# Patient Record
Sex: FEMALE | Race: BLACK OR AFRICAN AMERICAN | NOT HISPANIC OR LATINO | Employment: OTHER | URBAN - METROPOLITAN AREA
[De-identification: names, ages, dates, MRNs, and addresses within clinical notes are randomized per-mention and may not be internally consistent; named-entity substitution may affect disease eponyms.]

---

## 2024-01-01 ENCOUNTER — HOSPITAL ENCOUNTER (INPATIENT)
Facility: HOSPITAL | Age: 59
LOS: 3 days | DRG: 441 | End: 2024-11-09
Attending: HOSPITALIST | Admitting: HOSPITALIST
Payer: MEDICAID

## 2024-01-01 VITALS
OXYGEN SATURATION: 74 % | TEMPERATURE: 97 F | HEIGHT: 63 IN | SYSTOLIC BLOOD PRESSURE: 104 MMHG | DIASTOLIC BLOOD PRESSURE: 41 MMHG | HEART RATE: 22 BPM | BODY MASS INDEX: 29.95 KG/M2 | WEIGHT: 169.06 LBS

## 2024-01-01 DIAGNOSIS — N17.9 AKI (ACUTE KIDNEY INJURY): ICD-10-CM

## 2024-01-01 DIAGNOSIS — K74.60 DECOMPENSATED HEPATIC CIRRHOSIS: ICD-10-CM

## 2024-01-01 DIAGNOSIS — K76.7 HEPATORENAL SYNDROME: ICD-10-CM

## 2024-01-01 DIAGNOSIS — J96.01 ACUTE HYPOXEMIC RESPIRATORY FAILURE: Primary | ICD-10-CM

## 2024-01-01 DIAGNOSIS — R18.8 CIRRHOSIS OF LIVER WITH ASCITES, UNSPECIFIED HEPATIC CIRRHOSIS TYPE: ICD-10-CM

## 2024-01-01 DIAGNOSIS — R07.9 CHEST PAIN: ICD-10-CM

## 2024-01-01 DIAGNOSIS — J98.8 AIRWAY COMPROMISE: ICD-10-CM

## 2024-01-01 DIAGNOSIS — G93.40 ACUTE ENCEPHALOPATHY: ICD-10-CM

## 2024-01-01 DIAGNOSIS — K72.90 DECOMPENSATED HEPATIC CIRRHOSIS: ICD-10-CM

## 2024-01-01 DIAGNOSIS — E87.70 FLUID OVERLOAD: ICD-10-CM

## 2024-01-01 DIAGNOSIS — T80.92XA BLOOD TRANSFUSION REACTION, INITIAL ENCOUNTER: ICD-10-CM

## 2024-01-01 DIAGNOSIS — R94.31 QT PROLONGATION: ICD-10-CM

## 2024-01-01 DIAGNOSIS — K74.60 CIRRHOSIS OF LIVER WITH ASCITES, UNSPECIFIED HEPATIC CIRRHOSIS TYPE: ICD-10-CM

## 2024-01-01 LAB
ABO + RH BLD: NORMAL
ACANTHOCYTES BLD QL SMEAR: PRESENT
ALBUMIN SERPL BCP-MCNC: 3.2 G/DL (ref 3.5–5.2)
ALBUMIN SERPL BCP-MCNC: 3.4 G/DL (ref 3.5–5.2)
ALBUMIN SERPL BCP-MCNC: 3.4 G/DL (ref 3.5–5.2)
ALBUMIN SERPL BCP-MCNC: 3.6 G/DL (ref 3.5–5.2)
ALBUMIN SERPL BCP-MCNC: 4 G/DL (ref 3.5–5.2)
ALLENS TEST: ABNORMAL
ALP SERPL-CCNC: 50 U/L (ref 40–150)
ALP SERPL-CCNC: 51 U/L (ref 40–150)
ALP SERPL-CCNC: 51 U/L (ref 40–150)
ALT SERPL W/O P-5'-P-CCNC: 22 U/L (ref 10–44)
ALT SERPL W/O P-5'-P-CCNC: 27 U/L (ref 10–44)
ALT SERPL W/O P-5'-P-CCNC: 33 U/L (ref 10–44)
AMMONIA PLAS-SCNC: 120 UMOL/L (ref 10–50)
AMMONIA PLAS-SCNC: 129 UMOL/L (ref 10–50)
ANION GAP SERPL CALC-SCNC: 14 MMOL/L (ref 8–16)
ANION GAP SERPL CALC-SCNC: 18 MMOL/L (ref 8–16)
ANION GAP SERPL CALC-SCNC: 19 MMOL/L (ref 8–16)
ANION GAP SERPL CALC-SCNC: 23 MMOL/L (ref 8–16)
ANION GAP SERPL CALC-SCNC: 23 MMOL/L (ref 8–16)
ANION GAP SERPL CALC-SCNC: 24 MMOL/L (ref 8–16)
ANION GAP SERPL CALC-SCNC: 24 MMOL/L (ref 8–16)
ANISOCYTOSIS BLD QL SMEAR: SLIGHT
AST SERPL-CCNC: 119 U/L (ref 10–40)
AST SERPL-CCNC: 58 U/L (ref 10–40)
AST SERPL-CCNC: 88 U/L (ref 10–40)
BACTERIA #/AREA URNS AUTO: ABNORMAL /HPF
BACTERIA UR CULT: ABNORMAL
BASO STIPL BLD QL SMEAR: ABNORMAL
BASOPHILS # BLD AUTO: 0.02 K/UL (ref 0–0.2)
BASOPHILS # BLD AUTO: 0.03 K/UL (ref 0–0.2)
BASOPHILS # BLD AUTO: 0.14 K/UL (ref 0–0.2)
BASOPHILS # BLD AUTO: ABNORMAL K/UL (ref 0–0.2)
BASOPHILS NFR BLD: 0 % (ref 0–1.9)
BASOPHILS NFR BLD: 0.2 % (ref 0–1.9)
BASOPHILS NFR BLD: 0.3 % (ref 0–1.9)
BASOPHILS NFR BLD: 0.9 % (ref 0–1.9)
BILIRUB SERPL-MCNC: 10.4 MG/DL (ref 0.1–1)
BILIRUB SERPL-MCNC: 6.3 MG/DL (ref 0.1–1)
BILIRUB SERPL-MCNC: 7.4 MG/DL (ref 0.1–1)
BILIRUB UR QL STRIP: ABNORMAL
BLD GP AB SCN CELLS X3 SERPL QL: NORMAL
BLD PROD TYP BPU: NORMAL
BLOOD UNIT EXPIRATION DATE: NORMAL
BLOOD UNIT TYPE CODE: 8400
BLOOD UNIT TYPE: NORMAL
BNP SERPL-MCNC: 3181 PG/ML (ref 0–99)
BUN SERPL-MCNC: 47 MG/DL (ref 6–20)
BUN SERPL-MCNC: 57 MG/DL (ref 6–20)
BUN SERPL-MCNC: 58 MG/DL (ref 6–20)
BUN SERPL-MCNC: 59 MG/DL (ref 6–20)
BUN SERPL-MCNC: 63 MG/DL (ref 6–20)
BUN SERPL-MCNC: 63 MG/DL (ref 6–20)
BUN SERPL-MCNC: 65 MG/DL (ref 6–20)
BURR CELLS BLD QL SMEAR: ABNORMAL
C3 SERPL-MCNC: 29 MG/DL (ref 50–180)
C4 SERPL-MCNC: 5 MG/DL (ref 11–44)
CALCIUM SERPL-MCNC: 8.5 MG/DL (ref 8.7–10.5)
CALCIUM SERPL-MCNC: 8.8 MG/DL (ref 8.7–10.5)
CALCIUM SERPL-MCNC: 9.1 MG/DL (ref 8.7–10.5)
CALCIUM SERPL-MCNC: 9.3 MG/DL (ref 8.7–10.5)
CALCIUM SERPL-MCNC: 9.4 MG/DL (ref 8.7–10.5)
CALCIUM SERPL-MCNC: 9.7 MG/DL (ref 8.7–10.5)
CALCIUM SERPL-MCNC: 9.9 MG/DL (ref 8.7–10.5)
CHLORIDE SERPL-SCNC: 100 MMOL/L (ref 95–110)
CHLORIDE SERPL-SCNC: 97 MMOL/L (ref 95–110)
CHLORIDE SERPL-SCNC: 98 MMOL/L (ref 95–110)
CLARITY UR REFRACT.AUTO: ABNORMAL
CLINICAL BIOCHEMIST REVIEW: NORMAL
CO2 SERPL-SCNC: 16 MMOL/L (ref 23–29)
CO2 SERPL-SCNC: 17 MMOL/L (ref 23–29)
CO2 SERPL-SCNC: 17 MMOL/L (ref 23–29)
CO2 SERPL-SCNC: 18 MMOL/L (ref 23–29)
CO2 SERPL-SCNC: 18 MMOL/L (ref 23–29)
CO2 SERPL-SCNC: 21 MMOL/L (ref 23–29)
CO2 SERPL-SCNC: 24 MMOL/L (ref 23–29)
CODING SYSTEM: NORMAL
COLOR UR AUTO: YELLOW
CREAT SERPL-MCNC: 10.1 MG/DL (ref 0.5–1.4)
CREAT SERPL-MCNC: 10.3 MG/DL (ref 0.5–1.4)
CREAT SERPL-MCNC: 7.9 MG/DL (ref 0.5–1.4)
CREAT SERPL-MCNC: 8.7 MG/DL (ref 0.5–1.4)
CREAT SERPL-MCNC: 9.2 MG/DL (ref 0.5–1.4)
CREAT SERPL-MCNC: 9.6 MG/DL (ref 0.5–1.4)
CREAT SERPL-MCNC: 9.9 MG/DL (ref 0.5–1.4)
CREAT UR-MCNC: 238 MG/DL (ref 15–325)
CREAT UR-MCNC: 238 MG/DL (ref 15–325)
CROSSMATCH INTERPRETATION: NORMAL
DELSYS: ABNORMAL
DIFFERENTIAL METHOD BLD: ABNORMAL
DISPENSE STATUS: NORMAL
DOHLE BOD BLD QL SMEAR: PRESENT
DOHLE BOD BLD QL SMEAR: PRESENT
EOSINOPHIL # BLD AUTO: 0 K/UL (ref 0–0.5)
EOSINOPHIL # BLD AUTO: 0.1 K/UL (ref 0–0.5)
EOSINOPHIL # BLD AUTO: 0.2 K/UL (ref 0–0.5)
EOSINOPHIL # BLD AUTO: ABNORMAL K/UL (ref 0–0.5)
EOSINOPHIL NFR BLD: 0 % (ref 0–8)
EOSINOPHIL NFR BLD: 0.3 % (ref 0–8)
EOSINOPHIL NFR BLD: 0.3 % (ref 0–8)
EOSINOPHIL NFR BLD: 1.5 % (ref 0–8)
EOSINOPHIL URNS QL WRIGHT STN: ABNORMAL
ERYTHROCYTE [DISTWIDTH] IN BLOOD BY AUTOMATED COUNT: 18.8 % (ref 11.5–14.5)
ERYTHROCYTE [DISTWIDTH] IN BLOOD BY AUTOMATED COUNT: 19.1 % (ref 11.5–14.5)
ERYTHROCYTE [DISTWIDTH] IN BLOOD BY AUTOMATED COUNT: 19.8 % (ref 11.5–14.5)
ERYTHROCYTE [DISTWIDTH] IN BLOOD BY AUTOMATED COUNT: 21 % (ref 11.5–14.5)
ERYTHROCYTE [SEDIMENTATION RATE] IN BLOOD BY WESTERGREN METHOD: 25 MM/H
ERYTHROCYTE [SEDIMENTATION RATE] IN BLOOD BY WESTERGREN METHOD: 29 MM/H
ERYTHROCYTE [SEDIMENTATION RATE] IN BLOOD BY WESTERGREN METHOD: 29 MM/H
EST. GFR  (NO RACE VARIABLE): 4 ML/MIN/1.73 M^2
EST. GFR  (NO RACE VARIABLE): 4.1 ML/MIN/1.73 M^2
EST. GFR  (NO RACE VARIABLE): 4.2 ML/MIN/1.73 M^2
EST. GFR  (NO RACE VARIABLE): 4.3 ML/MIN/1.73 M^2
EST. GFR  (NO RACE VARIABLE): 4.6 ML/MIN/1.73 M^2
EST. GFR  (NO RACE VARIABLE): 4.9 ML/MIN/1.73 M^2
EST. GFR  (NO RACE VARIABLE): 5.5 ML/MIN/1.73 M^2
FIO2: 100
FLOW: 1
FOLATE SERPL-MCNC: 4.5 NG/ML (ref 4–24)
GLUCOSE SERPL-MCNC: 100 MG/DL (ref 70–110)
GLUCOSE SERPL-MCNC: 100 MG/DL (ref 70–110)
GLUCOSE SERPL-MCNC: 55 MG/DL (ref 70–110)
GLUCOSE SERPL-MCNC: 64 MG/DL (ref 70–110)
GLUCOSE SERPL-MCNC: 80 MG/DL (ref 70–110)
GLUCOSE SERPL-MCNC: 82 MG/DL (ref 70–110)
GLUCOSE SERPL-MCNC: 91 MG/DL (ref 70–110)
GLUCOSE UR QL STRIP: NEGATIVE
HCO3 UR-SCNC: 18.3 MMOL/L (ref 24–28)
HCO3 UR-SCNC: 19.7 MMOL/L (ref 24–28)
HCO3 UR-SCNC: 21.1 MMOL/L (ref 24–28)
HCO3 UR-SCNC: 22.6 MMOL/L (ref 24–28)
HCT VFR BLD AUTO: 20.7 % (ref 37–48.5)
HCT VFR BLD AUTO: 22.3 % (ref 37–48.5)
HCT VFR BLD AUTO: 23.5 % (ref 37–48.5)
HCT VFR BLD AUTO: 23.7 % (ref 37–48.5)
HCT VFR BLD CALC: 23 %PCV (ref 36–54)
HCT VFR BLD CALC: 25 %PCV (ref 36–54)
HCV RNA SERPL NAA+PROBE-LOG IU: 4.29 LOGIU/ML
HCV RNA SERPL QL NAA+PROBE: DETECTED
HCV RNA SPEC NAA+PROBE-ACNC: ABNORMAL IU/ML
HGB BLD-MCNC: 6.8 G/DL (ref 12–16)
HGB BLD-MCNC: 7.3 G/DL (ref 12–16)
HGB BLD-MCNC: 7.6 G/DL (ref 12–16)
HGB BLD-MCNC: 7.6 G/DL (ref 12–16)
HGB UR QL STRIP: ABNORMAL
HIV 1+2 AB+HIV1 P24 AG SERPL QL IA: NORMAL
HYALINE CASTS UR QL AUTO: 125 /LPF
HYPOCHROMIA BLD QL SMEAR: ABNORMAL
IMM GRANULOCYTES # BLD AUTO: 0.18 K/UL (ref 0–0.04)
IMM GRANULOCYTES # BLD AUTO: 0.28 K/UL (ref 0–0.04)
IMM GRANULOCYTES # BLD AUTO: 0.38 K/UL (ref 0–0.04)
IMM GRANULOCYTES # BLD AUTO: ABNORMAL K/UL (ref 0–0.04)
IMM GRANULOCYTES NFR BLD AUTO: 1.6 % (ref 0–0.5)
IMM GRANULOCYTES NFR BLD AUTO: 2.4 % (ref 0–0.5)
IMM GRANULOCYTES NFR BLD AUTO: 2.5 % (ref 0–0.5)
IMM GRANULOCYTES NFR BLD AUTO: ABNORMAL % (ref 0–0.5)
INFLUENZA A, MOLECULAR: NOT DETECTED
INFLUENZA B, MOLECULAR: NOT DETECTED
INR PPP: 2.2 (ref 0.8–1.2)
INR PPP: 2.4 (ref 0.8–1.2)
INR PPP: 2.8 (ref 0.8–1.2)
KETONES UR QL STRIP: NEGATIVE
LACTATE SERPL-SCNC: 11.7 MMOL/L (ref 0.5–2.2)
LACTATE SERPL-SCNC: 8.8 MMOL/L (ref 0.5–2.2)
LDH SERPL L TO P-CCNC: 8.89 MMOL/L (ref 0.36–1.25)
LEUKOCYTE ESTERASE UR QL STRIP: ABNORMAL
LYMPHOCYTES # BLD AUTO: 2.9 K/UL (ref 1–4.8)
LYMPHOCYTES # BLD AUTO: 3 K/UL (ref 1–4.8)
LYMPHOCYTES # BLD AUTO: 3.7 K/UL (ref 1–4.8)
LYMPHOCYTES # BLD AUTO: ABNORMAL K/UL (ref 1–4.8)
LYMPHOCYTES NFR BLD: 15 % (ref 18–48)
LYMPHOCYTES NFR BLD: 19.7 % (ref 18–48)
LYMPHOCYTES NFR BLD: 24.6 % (ref 18–48)
LYMPHOCYTES NFR BLD: 32.5 % (ref 18–48)
MAGNESIUM SERPL-MCNC: 2.2 MG/DL (ref 1.6–2.6)
MCH RBC QN AUTO: 32.5 PG (ref 27–31)
MCH RBC QN AUTO: 32.9 PG (ref 27–31)
MCH RBC QN AUTO: 33.3 PG (ref 27–31)
MCH RBC QN AUTO: 33.5 PG (ref 27–31)
MCHC RBC AUTO-ENTMCNC: 32.1 G/DL (ref 32–36)
MCHC RBC AUTO-ENTMCNC: 32.3 G/DL (ref 32–36)
MCHC RBC AUTO-ENTMCNC: 32.7 G/DL (ref 32–36)
MCHC RBC AUTO-ENTMCNC: 32.9 G/DL (ref 32–36)
MCV RBC AUTO: 101 FL (ref 82–98)
MCV RBC AUTO: 102 FL (ref 82–98)
METAMYELOCYTES NFR BLD MANUAL: 1 %
MICROSCOPIC COMMENT: ABNORMAL
MIN VOL: 8.17
MODE: ABNORMAL
MONOCYTES # BLD AUTO: 0.8 K/UL (ref 0.3–1)
MONOCYTES # BLD AUTO: 1.3 K/UL (ref 0.3–1)
MONOCYTES # BLD AUTO: 1.3 K/UL (ref 0.3–1)
MONOCYTES # BLD AUTO: ABNORMAL K/UL (ref 0.3–1)
MONOCYTES NFR BLD: 10 % (ref 4–15)
MONOCYTES NFR BLD: 11 % (ref 4–15)
MONOCYTES NFR BLD: 11 % (ref 4–15)
MONOCYTES NFR BLD: 5 % (ref 4–15)
MYELOCYTES NFR BLD MANUAL: 1 %
NEUTROPHILS # BLD AUTO: 10.8 K/UL (ref 1.8–7.7)
NEUTROPHILS # BLD AUTO: 6.1 K/UL (ref 1.8–7.7)
NEUTROPHILS # BLD AUTO: 7.2 K/UL (ref 1.8–7.7)
NEUTROPHILS NFR BLD: 53.1 % (ref 38–73)
NEUTROPHILS NFR BLD: 61.5 % (ref 38–73)
NEUTROPHILS NFR BLD: 67 % (ref 38–73)
NEUTROPHILS NFR BLD: 71.6 % (ref 38–73)
NEUTS BAND NFR BLD MANUAL: 6 %
NITRITE UR QL STRIP: NEGATIVE
NRBC BLD-RTO: 0 /100 WBC
NRBC BLD-RTO: 0 /100 WBC
NRBC BLD-RTO: 1 /100 WBC
NRBC BLD-RTO: 1 /100 WBC
OHS QRS DURATION: 70 MS
OHS QTC CALCULATION: 423 MS
OVALOCYTES BLD QL SMEAR: ABNORMAL
PCO2 BLDA: 28.5 MMHG (ref 35–45)
PCO2 BLDA: 54.9 MMHG (ref 35–45)
PCO2 BLDA: 58 MMHG (ref 35–45)
PCO2 BLDA: 59.9 MMHG (ref 35–45)
PEEP: 12
PEEP: 12
PEEP: 8
PH SMN: 7.11 [PH] (ref 7.35–7.45)
PH SMN: 7.12 [PH] (ref 7.35–7.45)
PH SMN: 7.22 [PH] (ref 7.35–7.45)
PH SMN: 7.48 [PH] (ref 7.35–7.45)
PH UR STRIP: 6 [PH] (ref 5–8)
PHOSPHATE SERPL-MCNC: 4.9 MG/DL (ref 2.7–4.5)
PHOSPHATE SERPL-MCNC: 5 MG/DL (ref 2.7–4.5)
PHOSPHATE SERPL-MCNC: 6.9 MG/DL (ref 2.7–4.5)
PHOSPHATE SERPL-MCNC: 7.7 MG/DL (ref 2.7–4.5)
PIP: 26
PIP: 27
PIP: 35
PLATELET # BLD AUTO: 34 K/UL (ref 150–450)
PLATELET # BLD AUTO: 41 K/UL (ref 150–450)
PLATELET # BLD AUTO: 43 K/UL (ref 150–450)
PLATELET # BLD AUTO: 47 K/UL (ref 150–450)
PLATELET BLD QL SMEAR: ABNORMAL
PLATELET BLD QL SMEAR: ABNORMAL
PLPETH BLD-MCNC: <10 NG/ML
PMV BLD AUTO: 10.3 FL (ref 9.2–12.9)
PMV BLD AUTO: 10.7 FL (ref 9.2–12.9)
PMV BLD AUTO: 10.9 FL (ref 9.2–12.9)
PMV BLD AUTO: 11 FL (ref 9.2–12.9)
PO2 BLDA: 55 MMHG (ref 80–100)
PO2 BLDA: 64 MMHG (ref 80–100)
PO2 BLDA: 65 MMHG (ref 80–100)
PO2 BLDA: 86 MMHG (ref 80–100)
POC BE: -10 MMOL/L
POC BE: -11 MMOL/L
POC BE: -2 MMOL/L
POC BE: -5 MMOL/L
POC IONIZED CALCIUM: 1.13 MMOL/L (ref 1.06–1.42)
POC IONIZED CALCIUM: 1.2 MMOL/L (ref 1.06–1.42)
POC SATURATED O2: 83 % (ref 95–100)
POC SATURATED O2: 83 % (ref 95–100)
POC SATURATED O2: 91 % (ref 95–100)
POC SATURATED O2: 94 % (ref 95–100)
POC TCO2: 20 MMOL/L (ref 23–27)
POC TCO2: 21 MMOL/L (ref 23–27)
POC TCO2: 22 MMOL/L (ref 23–27)
POC TCO2: 24 MMOL/L (ref 23–27)
POCT GLUCOSE: 110 MG/DL (ref 70–110)
POCT GLUCOSE: 131 MG/DL (ref 70–110)
POCT GLUCOSE: 198 MG/DL (ref 70–110)
POCT GLUCOSE: 39 MG/DL (ref 70–110)
POCT GLUCOSE: 62 MG/DL (ref 70–110)
POCT GLUCOSE: 78 MG/DL (ref 70–110)
POCT GLUCOSE: 86 MG/DL (ref 70–110)
POCT GLUCOSE: 89 MG/DL (ref 70–110)
POCT GLUCOSE: 92 MG/DL (ref 70–110)
POIKILOCYTOSIS BLD QL SMEAR: ABNORMAL
POIKILOCYTOSIS BLD QL SMEAR: SLIGHT
POLYCHROMASIA BLD QL SMEAR: ABNORMAL
POPETH BLD-MCNC: <10 NG/ML
POTASSIUM BLD-SCNC: 3.2 MMOL/L (ref 3.5–5.1)
POTASSIUM BLD-SCNC: 3.8 MMOL/L (ref 3.5–5.1)
POTASSIUM SERPL-SCNC: 3.3 MMOL/L (ref 3.5–5.1)
POTASSIUM SERPL-SCNC: 3.4 MMOL/L (ref 3.5–5.1)
POTASSIUM SERPL-SCNC: 3.4 MMOL/L (ref 3.5–5.1)
POTASSIUM SERPL-SCNC: 3.6 MMOL/L (ref 3.5–5.1)
POTASSIUM SERPL-SCNC: 3.7 MMOL/L (ref 3.5–5.1)
POTASSIUM SERPL-SCNC: 3.8 MMOL/L (ref 3.5–5.1)
POTASSIUM SERPL-SCNC: 3.9 MMOL/L (ref 3.5–5.1)
PROT SERPL-MCNC: 4.8 G/DL (ref 6–8.4)
PROT SERPL-MCNC: 4.9 G/DL (ref 6–8.4)
PROT SERPL-MCNC: 5.4 G/DL (ref 6–8.4)
PROT UR QL STRIP: ABNORMAL
PROT UR-MCNC: 1057 MG/DL (ref 0–15)
PROT/CREAT UR: 4.44 MG/G{CREAT} (ref 0–0.2)
PROTHROMBIN TIME: 22.7 SEC (ref 9–12.5)
PROTHROMBIN TIME: 24.4 SEC (ref 9–12.5)
PROTHROMBIN TIME: 28.4 SEC (ref 9–12.5)
RBC # BLD AUTO: 2.04 M/UL (ref 4–5.4)
RBC # BLD AUTO: 2.18 M/UL (ref 4–5.4)
RBC # BLD AUTO: 2.31 M/UL (ref 4–5.4)
RBC # BLD AUTO: 2.34 M/UL (ref 4–5.4)
RBC #/AREA URNS AUTO: >100 /HPF (ref 0–4)
RHEUMATOID FACT SERPL-ACNC: 53 IU/ML (ref 0–15)
RSV AG BY MOLECULAR METHOD: NOT DETECTED
SAMPLE: ABNORMAL
SARS-COV-2 RNA RESP QL NAA+PROBE: NOT DETECTED
SCHISTOCYTES BLD QL SMEAR: ABNORMAL
SCHISTOCYTES BLD QL SMEAR: PRESENT
SCHISTOCYTES BLD QL SMEAR: PRESENT
SITE: ABNORMAL
SMUDGE CELLS BLD QL SMEAR: PRESENT
SODIUM BLD-SCNC: 138 MMOL/L (ref 136–145)
SODIUM BLD-SCNC: 139 MMOL/L (ref 136–145)
SODIUM SERPL-SCNC: 136 MMOL/L (ref 136–145)
SODIUM SERPL-SCNC: 137 MMOL/L (ref 136–145)
SODIUM SERPL-SCNC: 137 MMOL/L (ref 136–145)
SODIUM SERPL-SCNC: 138 MMOL/L (ref 136–145)
SODIUM SERPL-SCNC: 139 MMOL/L (ref 136–145)
SODIUM UR-SCNC: 23 MMOL/L (ref 20–250)
SP GR UR STRIP: 1.02 (ref 1–1.03)
SP02: 76
SP02: 76
SP02: 92
SP02: 94
SPECIMEN OUTDATE: NORMAL
SQUAMOUS #/AREA URNS AUTO: 8 /HPF
T4 FREE SERPL-MCNC: 0.54 NG/DL (ref 0.71–1.51)
TOXIC GRANULES BLD QL SMEAR: PRESENT
TOXIC GRANULES BLD QL SMEAR: PRESENT
TRANS ERYTHROCYTES VOL PATIENT: NORMAL ML
TSH SERPL DL<=0.005 MIU/L-ACNC: 0.31 UIU/ML (ref 0.4–4)
URN SPEC COLLECT METH UR: ABNORMAL
UUN UR-MCNC: 204 MG/DL (ref 140–1050)
VANCOMYCIN SERPL-MCNC: 9.2 UG/ML
VIT B12 SERPL-MCNC: >2000 PG/ML (ref 210–950)
VT: 315
VT: 320
VT: 320
WBC # BLD AUTO: 11.37 K/UL (ref 3.9–12.7)
WBC # BLD AUTO: 11.68 K/UL (ref 3.9–12.7)
WBC # BLD AUTO: 12.33 K/UL (ref 3.9–12.7)
WBC # BLD AUTO: 15.07 K/UL (ref 3.9–12.7)
WBC #/AREA URNS AUTO: >100 /HPF (ref 0–5)
WBC CLUMPS UR QL AUTO: ABNORMAL

## 2024-01-01 PROCEDURE — 27100171 HC OXYGEN HIGH FLOW UP TO 24 HOURS

## 2024-01-01 PROCEDURE — 63600175 PHARM REV CODE 636 W HCPCS

## 2024-01-01 PROCEDURE — 99497 ADVNCD CARE PLAN 30 MIN: CPT | Mod: 25,,,

## 2024-01-01 PROCEDURE — 36415 COLL VENOUS BLD VENIPUNCTURE: CPT | Mod: XB | Performed by: HOSPITALIST

## 2024-01-01 PROCEDURE — 80048 BASIC METABOLIC PNL TOTAL CA: CPT | Mod: 91,XB | Performed by: INTERNAL MEDICINE

## 2024-01-01 PROCEDURE — 94003 VENT MGMT INPAT SUBQ DAY: CPT

## 2024-01-01 PROCEDURE — 82140 ASSAY OF AMMONIA: CPT

## 2024-01-01 PROCEDURE — P9021 RED BLOOD CELLS UNIT: HCPCS

## 2024-01-01 PROCEDURE — 25000003 PHARM REV CODE 250: Performed by: INTERNAL MEDICINE

## 2024-01-01 PROCEDURE — 85007 BL SMEAR W/DIFF WBC COUNT: CPT

## 2024-01-01 PROCEDURE — 36415 COLL VENOUS BLD VENIPUNCTURE: CPT

## 2024-01-01 PROCEDURE — 82330 ASSAY OF CALCIUM: CPT

## 2024-01-01 PROCEDURE — 85610 PROTHROMBIN TIME: CPT

## 2024-01-01 PROCEDURE — 84300 ASSAY OF URINE SODIUM: CPT

## 2024-01-01 PROCEDURE — 99900035 HC TECH TIME PER 15 MIN (STAT)

## 2024-01-01 PROCEDURE — 25000003 PHARM REV CODE 250

## 2024-01-01 PROCEDURE — 20600001 HC STEP DOWN PRIVATE ROOM

## 2024-01-01 PROCEDURE — P9047 ALBUMIN (HUMAN), 25%, 50ML: HCPCS | Mod: JZ,JG

## 2024-01-01 PROCEDURE — 84439 ASSAY OF FREE THYROXINE: CPT | Performed by: STUDENT IN AN ORGANIZED HEALTH CARE EDUCATION/TRAINING PROGRAM

## 2024-01-01 PROCEDURE — 31500 INSERT EMERGENCY AIRWAY: CPT | Mod: ,,, | Performed by: INTERNAL MEDICINE

## 2024-01-01 PROCEDURE — 27200966 HC CLOSED SUCTION SYSTEM

## 2024-01-01 PROCEDURE — 80053 COMPREHEN METABOLIC PANEL: CPT | Performed by: HOSPITALIST

## 2024-01-01 PROCEDURE — 82787 IGG 1 2 3 OR 4 EACH: CPT | Mod: 59 | Performed by: STUDENT IN AN ORGANIZED HEALTH CARE EDUCATION/TRAINING PROGRAM

## 2024-01-01 PROCEDURE — 80321 ALCOHOLS BIOMARKERS 1OR 2: CPT | Performed by: STUDENT IN AN ORGANIZED HEALTH CARE EDUCATION/TRAINING PROGRAM

## 2024-01-01 PROCEDURE — 25000003 PHARM REV CODE 250: Performed by: HOSPITALIST

## 2024-01-01 PROCEDURE — 87799 DETECT AGENT NOS DNA QUANT: CPT | Performed by: STUDENT IN AN ORGANIZED HEALTH CARE EDUCATION/TRAINING PROGRAM

## 2024-01-01 PROCEDURE — 36415 COLL VENOUS BLD VENIPUNCTURE: CPT | Performed by: STUDENT IN AN ORGANIZED HEALTH CARE EDUCATION/TRAINING PROGRAM

## 2024-01-01 PROCEDURE — 99900026 HC AIRWAY MAINTENANCE (STAT)

## 2024-01-01 PROCEDURE — 81001 URINALYSIS AUTO W/SCOPE: CPT

## 2024-01-01 PROCEDURE — 80069 RENAL FUNCTION PANEL: CPT | Performed by: HOSPITALIST

## 2024-01-01 PROCEDURE — 84540 ASSAY OF URINE/UREA-N: CPT

## 2024-01-01 PROCEDURE — 80048 BASIC METABOLIC PNL TOTAL CA: CPT | Performed by: INTERNAL MEDICINE

## 2024-01-01 PROCEDURE — 84295 ASSAY OF SERUM SODIUM: CPT

## 2024-01-01 PROCEDURE — 82746 ASSAY OF FOLIC ACID SERUM: CPT

## 2024-01-01 PROCEDURE — 63600175 PHARM REV CODE 636 W HCPCS: Performed by: INTERNAL MEDICINE

## 2024-01-01 PROCEDURE — C1752 CATH,HEMODIALYSIS,SHORT-TERM: HCPCS

## 2024-01-01 PROCEDURE — 80069 RENAL FUNCTION PANEL: CPT

## 2024-01-01 PROCEDURE — 85025 COMPLETE CBC W/AUTO DIFF WBC: CPT

## 2024-01-01 PROCEDURE — 83735 ASSAY OF MAGNESIUM: CPT

## 2024-01-01 PROCEDURE — 27000513 HC SENSOR FLOTRAC

## 2024-01-01 PROCEDURE — 87086 URINE CULTURE/COLONY COUNT: CPT

## 2024-01-01 PROCEDURE — 25000003 PHARM REV CODE 250: Performed by: STUDENT IN AN ORGANIZED HEALTH CARE EDUCATION/TRAINING PROGRAM

## 2024-01-01 PROCEDURE — 84443 ASSAY THYROID STIM HORMONE: CPT | Performed by: STUDENT IN AN ORGANIZED HEALTH CARE EDUCATION/TRAINING PROGRAM

## 2024-01-01 PROCEDURE — 84100 ASSAY OF PHOSPHORUS: CPT | Performed by: HOSPITALIST

## 2024-01-01 PROCEDURE — 94761 N-INVAS EAR/PLS OXIMETRY MLT: CPT | Mod: XB

## 2024-01-01 PROCEDURE — 82570 ASSAY OF URINE CREATININE: CPT

## 2024-01-01 PROCEDURE — 63600175 PHARM REV CODE 636 W HCPCS: Mod: JB

## 2024-01-01 PROCEDURE — 36430 TRANSFUSION BLD/BLD COMPNT: CPT

## 2024-01-01 PROCEDURE — 93005 ELECTROCARDIOGRAM TRACING: CPT

## 2024-01-01 PROCEDURE — 85027 COMPLETE CBC AUTOMATED: CPT

## 2024-01-01 PROCEDURE — 86160 COMPLEMENT ANTIGEN: CPT | Mod: 59 | Performed by: STUDENT IN AN ORGANIZED HEALTH CARE EDUCATION/TRAINING PROGRAM

## 2024-01-01 PROCEDURE — 20000000 HC ICU ROOM

## 2024-01-01 PROCEDURE — 83605 ASSAY OF LACTIC ACID: CPT

## 2024-01-01 PROCEDURE — 93010 ELECTROCARDIOGRAM REPORT: CPT | Mod: ,,, | Performed by: INTERNAL MEDICINE

## 2024-01-01 PROCEDURE — 99223 1ST HOSP IP/OBS HIGH 75: CPT | Mod: 25,,,

## 2024-01-01 PROCEDURE — 0241U SARS-COV2 (COVID) WITH FLU/RSV BY PCR: CPT | Performed by: STUDENT IN AN ORGANIZED HEALTH CARE EDUCATION/TRAINING PROGRAM

## 2024-01-01 PROCEDURE — 63600175 PHARM REV CODE 636 W HCPCS: Mod: JZ,JG

## 2024-01-01 PROCEDURE — 0BH17EZ INSERTION OF ENDOTRACHEAL AIRWAY INTO TRACHEA, VIA NATURAL OR ARTIFICIAL OPENING: ICD-10-PCS | Performed by: INTERNAL MEDICINE

## 2024-01-01 PROCEDURE — 82803 BLOOD GASES ANY COMBINATION: CPT

## 2024-01-01 PROCEDURE — 99498 ADVNCD CARE PLAN ADDL 30 MIN: CPT | Mod: ,,,

## 2024-01-01 PROCEDURE — 36620 INSERTION CATHETER ARTERY: CPT

## 2024-01-01 PROCEDURE — 83516 IMMUNOASSAY NONANTIBODY: CPT | Performed by: STUDENT IN AN ORGANIZED HEALTH CARE EDUCATION/TRAINING PROGRAM

## 2024-01-01 PROCEDURE — 83521 IG LIGHT CHAINS FREE EACH: CPT | Mod: 59 | Performed by: STUDENT IN AN ORGANIZED HEALTH CARE EDUCATION/TRAINING PROGRAM

## 2024-01-01 PROCEDURE — 94761 N-INVAS EAR/PLS OXIMETRY MLT: CPT

## 2024-01-01 PROCEDURE — 30233N1 TRANSFUSION OF NONAUTOLOGOUS RED BLOOD CELLS INTO PERIPHERAL VEIN, PERCUTANEOUS APPROACH: ICD-10-PCS | Performed by: INTERNAL MEDICINE

## 2024-01-01 PROCEDURE — 99232 SBSQ HOSP IP/OBS MODERATE 35: CPT | Mod: ,,, | Performed by: INTERNAL MEDICINE

## 2024-01-01 PROCEDURE — 86901 BLOOD TYPING SEROLOGIC RH(D): CPT | Performed by: HOSPITALIST

## 2024-01-01 PROCEDURE — 87799 DETECT AGENT NOS DNA QUANT: CPT | Mod: 91 | Performed by: STUDENT IN AN ORGANIZED HEALTH CARE EDUCATION/TRAINING PROGRAM

## 2024-01-01 PROCEDURE — 36600 WITHDRAWAL OF ARTERIAL BLOOD: CPT

## 2024-01-01 PROCEDURE — 84165 PROTEIN E-PHORESIS SERUM: CPT | Mod: 26,,, | Performed by: PATHOLOGY

## 2024-01-01 PROCEDURE — 80053 COMPREHEN METABOLIC PANEL: CPT

## 2024-01-01 PROCEDURE — 86036 ANCA SCREEN EACH ANTIBODY: CPT | Mod: 59 | Performed by: STUDENT IN AN ORGANIZED HEALTH CARE EDUCATION/TRAINING PROGRAM

## 2024-01-01 PROCEDURE — 82595 ASSAY OF CRYOGLOBULIN: CPT | Performed by: STUDENT IN AN ORGANIZED HEALTH CARE EDUCATION/TRAINING PROGRAM

## 2024-01-01 PROCEDURE — 63600175 PHARM REV CODE 636 W HCPCS: Performed by: STUDENT IN AN ORGANIZED HEALTH CARE EDUCATION/TRAINING PROGRAM

## 2024-01-01 PROCEDURE — 85014 HEMATOCRIT: CPT

## 2024-01-01 PROCEDURE — 86160 COMPLEMENT ANTIGEN: CPT | Performed by: STUDENT IN AN ORGANIZED HEALTH CARE EDUCATION/TRAINING PROGRAM

## 2024-01-01 PROCEDURE — 5A1935Z RESPIRATORY VENTILATION, LESS THAN 24 CONSECUTIVE HOURS: ICD-10-PCS | Performed by: INTERNAL MEDICINE

## 2024-01-01 PROCEDURE — 84165 PROTEIN E-PHORESIS SERUM: CPT | Performed by: STUDENT IN AN ORGANIZED HEALTH CARE EDUCATION/TRAINING PROGRAM

## 2024-01-01 PROCEDURE — 87389 HIV-1 AG W/HIV-1&-2 AB AG IA: CPT

## 2024-01-01 PROCEDURE — 87088 URINE BACTERIA CULTURE: CPT

## 2024-01-01 PROCEDURE — 86078 PHYS BLOOD BANK SERV REACTJ: CPT | Mod: ,,, | Performed by: PATHOLOGY

## 2024-01-01 PROCEDURE — 36620 INSERTION CATHETER ARTERY: CPT | Mod: 59,,,

## 2024-01-01 PROCEDURE — 63600175 PHARM REV CODE 636 W HCPCS: Performed by: HOSPITALIST

## 2024-01-01 PROCEDURE — 87106 FUNGI IDENTIFICATION YEAST: CPT

## 2024-01-01 PROCEDURE — 87040 BLOOD CULTURE FOR BACTERIA: CPT | Performed by: STUDENT IN AN ORGANIZED HEALTH CARE EDUCATION/TRAINING PROGRAM

## 2024-01-01 PROCEDURE — 80202 ASSAY OF VANCOMYCIN: CPT | Performed by: HOSPITALIST

## 2024-01-01 PROCEDURE — 84132 ASSAY OF SERUM POTASSIUM: CPT

## 2024-01-01 PROCEDURE — 83516 IMMUNOASSAY NONANTIBODY: CPT | Mod: 59 | Performed by: STUDENT IN AN ORGANIZED HEALTH CARE EDUCATION/TRAINING PROGRAM

## 2024-01-01 PROCEDURE — 90945 DIALYSIS ONE EVALUATION: CPT

## 2024-01-01 PROCEDURE — 83880 ASSAY OF NATRIURETIC PEPTIDE: CPT | Performed by: INTERNAL MEDICINE

## 2024-01-01 PROCEDURE — 37799 UNLISTED PX VASCULAR SURGERY: CPT

## 2024-01-01 PROCEDURE — 87522 HEPATITIS C REVRS TRNSCRPJ: CPT | Performed by: STUDENT IN AN ORGANIZED HEALTH CARE EDUCATION/TRAINING PROGRAM

## 2024-01-01 PROCEDURE — 99291 CRITICAL CARE FIRST HOUR: CPT | Mod: 25,,, | Performed by: INTERNAL MEDICINE

## 2024-01-01 PROCEDURE — 82607 VITAMIN B-12: CPT

## 2024-01-01 PROCEDURE — 86431 RHEUMATOID FACTOR QUANT: CPT | Performed by: STUDENT IN AN ORGANIZED HEALTH CARE EDUCATION/TRAINING PROGRAM

## 2024-01-01 PROCEDURE — 99292 CRITICAL CARE ADDL 30 MIN: CPT | Mod: 25,,, | Performed by: INTERNAL MEDICINE

## 2024-01-01 PROCEDURE — 27000923 HC TRIALYSIS CATHETER, ANY SIZE

## 2024-01-01 PROCEDURE — 85025 COMPLETE CBC W/AUTO DIFF WBC: CPT | Mod: 91

## 2024-01-01 PROCEDURE — 86920 COMPATIBILITY TEST SPIN: CPT

## 2024-01-01 PROCEDURE — 87205 SMEAR GRAM STAIN: CPT

## 2024-01-01 PROCEDURE — 99222 1ST HOSP IP/OBS MODERATE 55: CPT | Mod: ,,, | Performed by: INTERNAL MEDICINE

## 2024-01-01 PROCEDURE — 63600175 PHARM REV CODE 636 W HCPCS: Mod: JG

## 2024-01-01 RX ORDER — LACTULOSE 10 G/15ML
30 SOLUTION ORAL DAILY
Status: DISCONTINUED | OUTPATIENT
Start: 2024-01-01 | End: 2024-01-01

## 2024-01-01 RX ORDER — ALBUMIN HUMAN 250 G/1000ML
25 SOLUTION INTRAVENOUS 2 TIMES DAILY
Status: DISCONTINUED | OUTPATIENT
Start: 2024-01-01 | End: 2024-01-01

## 2024-01-01 RX ORDER — HYDROMORPHONE HYDROCHLORIDE 1 MG/ML
0.5 INJECTION, SOLUTION INTRAMUSCULAR; INTRAVENOUS; SUBCUTANEOUS
Status: DISCONTINUED | OUTPATIENT
Start: 2024-01-01 | End: 2024-01-01 | Stop reason: HOSPADM

## 2024-01-01 RX ORDER — LORAZEPAM 2 MG/ML
0.5 INJECTION INTRAMUSCULAR EVERY 30 MIN PRN
Status: DISCONTINUED | OUTPATIENT
Start: 2024-01-01 | End: 2024-01-01 | Stop reason: HOSPADM

## 2024-01-01 RX ORDER — MORPHINE SULFATE 2 MG/ML
2 INJECTION, SOLUTION INTRAMUSCULAR; INTRAVENOUS EVERY 6 HOURS PRN
Status: DISCONTINUED | OUTPATIENT
Start: 2024-01-01 | End: 2024-01-01

## 2024-01-01 RX ORDER — HYDROCODONE BITARTRATE AND ACETAMINOPHEN 500; 5 MG/1; MG/1
TABLET ORAL CONTINUOUS
Status: DISCONTINUED | OUTPATIENT
Start: 2024-01-01 | End: 2024-01-01

## 2024-01-01 RX ORDER — ONDANSETRON HYDROCHLORIDE 2 MG/ML
8 INJECTION, SOLUTION INTRAVENOUS EVERY 6 HOURS PRN
Status: DISCONTINUED | OUTPATIENT
Start: 2024-01-01 | End: 2024-01-01 | Stop reason: HOSPADM

## 2024-01-01 RX ORDER — MORPHINE SULFATE 2 MG/ML
2 INJECTION, SOLUTION INTRAMUSCULAR; INTRAVENOUS ONCE
Status: DISCONTINUED | OUTPATIENT
Start: 2024-01-01 | End: 2024-01-01

## 2024-01-01 RX ORDER — MORPHINE SULFATE 2 MG/ML
5 INJECTION, SOLUTION INTRAMUSCULAR; INTRAVENOUS ONCE
Status: DISCONTINUED | OUTPATIENT
Start: 2024-01-01 | End: 2024-01-01 | Stop reason: HOSPADM

## 2024-01-01 RX ORDER — ROCURONIUM BROMIDE 10 MG/ML
INJECTION, SOLUTION INTRAVENOUS
Status: DISCONTINUED
Start: 2024-01-01 | End: 2024-01-01 | Stop reason: WASHOUT

## 2024-01-01 RX ORDER — LACTULOSE 10 G/15ML
200 SOLUTION ORAL; RECTAL 2 TIMES DAILY
Status: DISCONTINUED | OUTPATIENT
Start: 2024-01-01 | End: 2024-01-01

## 2024-01-01 RX ORDER — MORPHINE SULFATE 2 MG/ML
10 INJECTION, SOLUTION INTRAMUSCULAR; INTRAVENOUS
Status: DISCONTINUED | OUTPATIENT
Start: 2024-01-01 | End: 2024-01-01 | Stop reason: HOSPADM

## 2024-01-01 RX ORDER — NALOXONE HCL 0.4 MG/ML
0.02 VIAL (ML) INJECTION
Status: DISCONTINUED | OUTPATIENT
Start: 2024-01-01 | End: 2024-01-01 | Stop reason: HOSPADM

## 2024-01-01 RX ORDER — VASOPRESSIN 20 [USP'U]/ML
INJECTION, SOLUTION INTRAMUSCULAR; SUBCUTANEOUS
Status: COMPLETED
Start: 2024-01-01 | End: 2024-01-01

## 2024-01-01 RX ORDER — CHLORHEXIDINE GLUCONATE ORAL RINSE 1.2 MG/ML
15 SOLUTION DENTAL 2 TIMES DAILY
Status: DISCONTINUED | OUTPATIENT
Start: 2024-01-01 | End: 2024-01-01

## 2024-01-01 RX ORDER — LORAZEPAM 2 MG/ML
2 INJECTION INTRAMUSCULAR
Status: DISCONTINUED | OUTPATIENT
Start: 2024-01-01 | End: 2024-01-01 | Stop reason: HOSPADM

## 2024-01-01 RX ORDER — PANTOPRAZOLE SODIUM 40 MG/10ML
40 INJECTION, POWDER, LYOPHILIZED, FOR SOLUTION INTRAVENOUS 2 TIMES DAILY
Status: DISCONTINUED | OUTPATIENT
Start: 2024-01-01 | End: 2024-01-01

## 2024-01-01 RX ORDER — SODIUM CHLORIDE 0.9 % (FLUSH) 0.9 %
10 SYRINGE (ML) INJECTION EVERY 12 HOURS PRN
Status: DISCONTINUED | OUTPATIENT
Start: 2024-01-01 | End: 2024-01-01 | Stop reason: HOSPADM

## 2024-01-01 RX ORDER — PROPOFOL 10 MG/ML
0-50 INJECTION, EMULSION INTRAVENOUS CONTINUOUS
Status: DISCONTINUED | OUTPATIENT
Start: 2024-01-01 | End: 2024-01-01

## 2024-01-01 RX ORDER — FENTANYL CITRATE-0.9 % NACL/PF 10 MCG/ML
0-250 PLASTIC BAG, INJECTION (ML) INTRAVENOUS CONTINUOUS
Status: DISCONTINUED | OUTPATIENT
Start: 2024-01-01 | End: 2024-01-01

## 2024-01-01 RX ORDER — NOREPINEPHRINE BITARTRATE 1 MG/ML
0-3 INJECTION INTRAVENOUS CONTINUOUS
Status: DISCONTINUED | OUTPATIENT
Start: 2024-01-01 | End: 2024-01-01

## 2024-01-01 RX ORDER — LACTULOSE 10 G/15ML
15 SOLUTION ORAL 3 TIMES DAILY
Status: DISCONTINUED | OUTPATIENT
Start: 2024-01-01 | End: 2024-01-01

## 2024-01-01 RX ORDER — ALPRAZOLAM 0.5 MG/1
0.5 TABLET ORAL 2 TIMES DAILY PRN
Status: DISCONTINUED | OUTPATIENT
Start: 2024-01-01 | End: 2024-01-01

## 2024-01-01 RX ORDER — IBUPROFEN 200 MG
24 TABLET ORAL
Status: DISCONTINUED | OUTPATIENT
Start: 2024-01-01 | End: 2024-01-01 | Stop reason: HOSPADM

## 2024-01-01 RX ORDER — POTASSIUM CHLORIDE 7.45 MG/ML
10 INJECTION INTRAVENOUS
Status: COMPLETED | OUTPATIENT
Start: 2024-01-01 | End: 2024-01-01

## 2024-01-01 RX ORDER — HYDROCODONE BITARTRATE AND ACETAMINOPHEN 500; 5 MG/1; MG/1
TABLET ORAL
Status: DISCONTINUED | OUTPATIENT
Start: 2024-01-01 | End: 2024-01-01

## 2024-01-01 RX ORDER — CEFTRIAXONE 1 G/1
1 INJECTION, POWDER, FOR SOLUTION INTRAMUSCULAR; INTRAVENOUS
Status: DISCONTINUED | OUTPATIENT
Start: 2024-01-01 | End: 2024-01-01

## 2024-01-01 RX ORDER — FLUDROCORTISONE ACETATE 0.1 MG/1
100 TABLET ORAL DAILY
Status: DISCONTINUED | OUTPATIENT
Start: 2024-01-01 | End: 2024-01-01

## 2024-01-01 RX ORDER — MAGNESIUM SULFATE HEPTAHYDRATE 40 MG/ML
2 INJECTION, SOLUTION INTRAVENOUS
Status: DISCONTINUED | OUTPATIENT
Start: 2024-01-01 | End: 2024-01-01

## 2024-01-01 RX ORDER — IBUPROFEN 200 MG
16 TABLET ORAL
Status: DISCONTINUED | OUTPATIENT
Start: 2024-01-01 | End: 2024-01-01 | Stop reason: HOSPADM

## 2024-01-01 RX ORDER — SUCCINYLCHOLINE CHLORIDE 20 MG/ML
INJECTION INTRAMUSCULAR; INTRAVENOUS
Status: DISCONTINUED
Start: 2024-01-01 | End: 2024-01-01 | Stop reason: WASHOUT

## 2024-01-01 RX ORDER — ROCURONIUM BROMIDE 10 MG/ML
80 INJECTION, SOLUTION INTRAVENOUS ONCE
Status: COMPLETED | OUTPATIENT
Start: 2024-01-01 | End: 2024-01-01

## 2024-01-01 RX ORDER — LORAZEPAM 2 MG/ML
1 INJECTION INTRAMUSCULAR EVERY 4 HOURS PRN
Status: CANCELLED | OUTPATIENT
Start: 2024-01-01

## 2024-01-01 RX ORDER — LACTULOSE 10 G/15ML
30 SOLUTION ORAL 3 TIMES DAILY
Status: DISCONTINUED | OUTPATIENT
Start: 2024-01-01 | End: 2024-01-01

## 2024-01-01 RX ORDER — MUPIROCIN 20 MG/G
OINTMENT TOPICAL 2 TIMES DAILY
Status: DISCONTINUED | OUTPATIENT
Start: 2024-01-01 | End: 2024-01-01

## 2024-01-01 RX ORDER — FUROSEMIDE 10 MG/ML
120 INJECTION INTRAMUSCULAR; INTRAVENOUS ONCE
Status: COMPLETED | OUTPATIENT
Start: 2024-01-01 | End: 2024-01-01

## 2024-01-01 RX ORDER — HYDROCODONE BITARTRATE AND ACETAMINOPHEN 500; 5 MG/1; MG/1
TABLET ORAL
Status: DISCONTINUED | OUTPATIENT
Start: 2024-01-01 | End: 2024-01-01 | Stop reason: HOSPADM

## 2024-01-01 RX ORDER — MEROPENEM 1 G/1
1 INJECTION, POWDER, FOR SOLUTION INTRAVENOUS
Status: DISCONTINUED | OUTPATIENT
Start: 2024-01-01 | End: 2024-01-01

## 2024-01-01 RX ORDER — PROPOFOL 10 MG/ML
INJECTION, EMULSION INTRAVENOUS
Status: DISCONTINUED
Start: 2024-01-01 | End: 2024-01-01 | Stop reason: WASHOUT

## 2024-01-01 RX ORDER — GLUCAGON 1 MG
1 KIT INJECTION
Status: DISCONTINUED | OUTPATIENT
Start: 2024-01-01 | End: 2024-01-01 | Stop reason: HOSPADM

## 2024-01-01 RX ORDER — TRAMADOL HYDROCHLORIDE 50 MG/1
50 TABLET ORAL EVERY 8 HOURS PRN
Status: DISCONTINUED | OUTPATIENT
Start: 2024-01-01 | End: 2024-01-01

## 2024-01-01 RX ORDER — OCTREOTIDE ACETATE 100 UG/ML
100 INJECTION, SOLUTION INTRAVENOUS; SUBCUTANEOUS EVERY 8 HOURS
Status: DISCONTINUED | OUTPATIENT
Start: 2024-01-01 | End: 2024-01-01

## 2024-01-01 RX ORDER — MIDODRINE HYDROCHLORIDE 5 MG/1
10 TABLET ORAL EVERY 8 HOURS
Status: DISCONTINUED | OUTPATIENT
Start: 2024-01-01 | End: 2024-01-01

## 2024-01-01 RX ORDER — ETOMIDATE 2 MG/ML
30 INJECTION INTRAVENOUS ONCE
Status: COMPLETED | OUTPATIENT
Start: 2024-01-01 | End: 2024-01-01

## 2024-01-01 RX ORDER — NOREPINEPHRINE BITARTRATE 0.02 MG/ML
0-3 INJECTION, SOLUTION INTRAVENOUS CONTINUOUS
Status: DISCONTINUED | OUTPATIENT
Start: 2024-01-01 | End: 2024-01-01

## 2024-01-01 RX ADMIN — OCTREOTIDE ACETATE 100 MCG: 100 INJECTION, SOLUTION INTRAVENOUS; SUBCUTANEOUS at 10:11

## 2024-01-01 RX ADMIN — LACTULOSE 15 G: 20 SOLUTION ORAL at 11:11

## 2024-01-01 RX ADMIN — RIFAXIMIN 550 MG: 550 TABLET ORAL at 11:11

## 2024-01-01 RX ADMIN — NOREPINEPHRINE BITARTRATE 0.28 MCG/KG/MIN: 1 INJECTION, SOLUTION, CONCENTRATE INTRAVENOUS at 06:11

## 2024-01-01 RX ADMIN — MEROPENEM 1 G: 1 INJECTION INTRAVENOUS at 08:11

## 2024-01-01 RX ADMIN — MUPIROCIN: 20 OINTMENT TOPICAL at 08:11

## 2024-01-01 RX ADMIN — PHYTONADIONE 10 MG: 10 INJECTION, EMULSION INTRAMUSCULAR; INTRAVENOUS; SUBCUTANEOUS at 08:11

## 2024-01-01 RX ADMIN — MIDODRINE HYDROCHLORIDE 10 MG: 5 TABLET ORAL at 10:11

## 2024-01-01 RX ADMIN — CEFTRIAXONE 1 G: 1 INJECTION, POWDER, FOR SOLUTION INTRAMUSCULAR; INTRAVENOUS at 06:11

## 2024-01-01 RX ADMIN — CHLOROTHIAZIDE SODIUM 500 MG: 500 INJECTION, POWDER, LYOPHILIZED, FOR SOLUTION INTRAVENOUS at 09:11

## 2024-01-01 RX ADMIN — OCTREOTIDE ACETATE 100 MCG: 100 INJECTION, SOLUTION INTRAVENOUS; SUBCUTANEOUS at 05:11

## 2024-01-01 RX ADMIN — VASOPRESSIN 0.04 UNITS/MIN: 20 INJECTION INTRAVENOUS at 06:11

## 2024-01-01 RX ADMIN — NOREPINEPHRINE BITARTRATE 0.02 MCG/KG/MIN: 0.02 INJECTION, SOLUTION INTRAVENOUS at 03:11

## 2024-01-01 RX ADMIN — MIDODRINE HYDROCHLORIDE 10 MG: 5 TABLET ORAL at 03:11

## 2024-01-01 RX ADMIN — PANTOPRAZOLE SODIUM 40 MG: 40 INJECTION, POWDER, FOR SOLUTION INTRAVENOUS at 08:11

## 2024-01-01 RX ADMIN — OCTREOTIDE ACETATE 100 MCG: 100 INJECTION, SOLUTION INTRAVENOUS; SUBCUTANEOUS at 03:11

## 2024-01-01 RX ADMIN — DEXTROSE MONOHYDRATE 125 ML: 100 INJECTION, SOLUTION INTRAVENOUS at 07:11

## 2024-01-01 RX ADMIN — PROPOFOL 20 MCG/KG/MIN: 10 INJECTION, EMULSION INTRAVENOUS at 06:11

## 2024-01-01 RX ADMIN — FUROSEMIDE 160 MG: 10 INJECTION, SOLUTION INTRAMUSCULAR; INTRAVENOUS at 11:11

## 2024-01-01 RX ADMIN — SODIUM CHLORIDE: 9 INJECTION, SOLUTION INTRAVENOUS at 05:11

## 2024-01-01 RX ADMIN — DEXTROSE MONOHYDRATE 250 ML: 100 INJECTION, SOLUTION INTRAVENOUS at 03:11

## 2024-01-01 RX ADMIN — ROCURONIUM BROMIDE 80 MG: 10 INJECTION, SOLUTION INTRAVENOUS at 05:11

## 2024-01-01 RX ADMIN — FLUDROCORTISONE ACETATE 100 MCG: 0.1 TABLET ORAL at 11:11

## 2024-01-01 RX ADMIN — LACTULOSE 30 G: 20 SOLUTION ORAL at 03:11

## 2024-01-01 RX ADMIN — OCTREOTIDE ACETATE 100 MCG: 100 INJECTION, SOLUTION INTRAVENOUS; SUBCUTANEOUS at 11:11

## 2024-01-01 RX ADMIN — ALBUMIN (HUMAN) 25 G: 12.5 SOLUTION INTRAVENOUS at 10:11

## 2024-01-01 RX ADMIN — VASOPRESSIN 20 UNITS: 20 INJECTION INTRAVENOUS at 06:11

## 2024-01-01 RX ADMIN — HYDROMORPHONE HYDROCHLORIDE 0.5 MG: 1 INJECTION, SOLUTION INTRAMUSCULAR; INTRAVENOUS; SUBCUTANEOUS at 11:11

## 2024-01-01 RX ADMIN — OCTREOTIDE ACETATE 100 MCG: 100 INJECTION, SOLUTION INTRAVENOUS; SUBCUTANEOUS at 06:11

## 2024-01-01 RX ADMIN — LACTULOSE 30 G: 20 SOLUTION ORAL at 09:11

## 2024-01-01 RX ADMIN — POTASSIUM CHLORIDE 10 MEQ: 7.46 INJECTION, SOLUTION INTRAVENOUS at 11:11

## 2024-01-01 RX ADMIN — FUROSEMIDE 120 MG: 10 INJECTION, SOLUTION INTRAVENOUS at 08:11

## 2024-01-01 RX ADMIN — VANCOMYCIN HYDROCHLORIDE 1250 MG: 1.25 INJECTION, POWDER, LYOPHILIZED, FOR SOLUTION INTRAVENOUS at 09:11

## 2024-01-01 RX ADMIN — LACTULOSE 200 G: 10 SOLUTION ORAL at 01:11

## 2024-01-01 RX ADMIN — HYDROCORTISONE SODIUM SUCCINATE 100 MG: 100 INJECTION, POWDER, FOR SOLUTION INTRAMUSCULAR; INTRAVENOUS at 11:11

## 2024-01-01 RX ADMIN — ETOMIDATE 30 MG: 2 INJECTION INTRAVENOUS at 05:11

## 2024-01-01 RX ADMIN — MORPHINE SULFATE 2 MG: 2 INJECTION, SOLUTION INTRAMUSCULAR; INTRAVENOUS at 10:11

## 2024-01-01 RX ADMIN — VANCOMYCIN HYDROCHLORIDE 750 MG: 750 INJECTION, POWDER, LYOPHILIZED, FOR SOLUTION INTRAVENOUS at 06:11

## 2024-01-01 RX ADMIN — POTASSIUM BICARBONATE 20 MEQ: 391 TABLET, EFFERVESCENT ORAL at 08:11

## 2024-01-01 RX ADMIN — LACTULOSE 30 G: 20 SOLUTION ORAL at 10:11

## 2024-01-01 RX ADMIN — MEROPENEM 1 G: 1 INJECTION INTRAVENOUS at 12:11

## 2024-01-01 RX ADMIN — MIDODRINE HYDROCHLORIDE 10 MG: 5 TABLET ORAL at 05:11

## 2024-01-01 RX ADMIN — ALBUMIN (HUMAN) 25 G: 12.5 SOLUTION INTRAVENOUS at 08:11

## 2024-01-01 RX ADMIN — TRAMADOL HYDROCHLORIDE 50 MG: 50 TABLET, COATED ORAL at 05:11

## 2024-01-01 RX ADMIN — MEROPENEM 1 G: 1 INJECTION INTRAVENOUS at 06:11

## 2024-01-01 RX ADMIN — CHLORHEXIDINE GLUCONATE 0.12% ORAL RINSE 15 ML: 1.2 LIQUID ORAL at 09:11

## 2024-11-01 PROBLEM — K76.7 HEPATORENAL SYNDROME: Status: ACTIVE | Noted: 2024-01-01

## 2024-11-01 PROBLEM — N17.9 AKI (ACUTE KIDNEY INJURY): Status: ACTIVE | Noted: 2024-01-01

## 2024-11-01 PROBLEM — K70.31 ASCITES DUE TO ALCOHOLIC CIRRHOSIS: Status: ACTIVE | Noted: 2024-01-01

## 2024-11-01 PROBLEM — K74.60 CIRRHOSIS: Status: ACTIVE | Noted: 2024-01-01

## 2024-11-01 PROBLEM — R60.0 LOWER EXTREMITY EDEMA: Status: ACTIVE | Noted: 2024-01-01

## 2024-11-01 PROBLEM — Z71.89 ACP (ADVANCE CARE PLANNING): Status: ACTIVE | Noted: 2024-01-01

## 2024-11-03 PROBLEM — N17.9 AKI (ACUTE KIDNEY INJURY): Status: ACTIVE | Noted: 2024-01-01

## 2024-11-04 PROBLEM — B19.20 HEPATITIS C: Status: ACTIVE | Noted: 2024-01-01

## 2024-11-05 NOTE — PROVIDER TRANSFER
Outside Transfer Acceptance Note / Regional Referral Center    Referring facility: [facility]   Referring provider: ANDREAS GOVEA  Accepting facility: [facility]  Accepting provider: LUIS ANTONIO MENDOZA  Reason for transfer: Higher Level of Care   Transfer diagnosis: Hepatic cirrhosis, hepatorenal sydnrome  Transfer specialty requested: Hepatology  Transfer specialty notified: No  Transfer level: NUMBER 1-5: 2  Bed type requested: step down  Isolation status: No active isolations   Admission class or status: IP- Inpatient    Narrative     59-y/o w with PMH alcoholic cirrhosis, ascites, HCV transferred to [facility] from [facility] on 11/01 with acute kidney injury following paracentesis (5 L) with c/f hepatorenal syndrome.  Patient reports that her last alcohol intake was several months ago.      On admission /52, HR 95, RR 14, SpO2 98%, T 97.6°. Labs (11/2) WBC 15.5, Hb 7.7, Plts 55, INR 2.5, Na 135, K 3.3, Cr 2.27, T bili 10.0, , ALT 57, ammonia 60.   BL renal US showed no evidence of hydronephrosis or acute changes.       Patient started on hepatorenal protocol (octreotide, albumin), lactulose and midodrine (10 mg Q 8 h) with SBP mostly in the mid-teens. Renal function has continued to decline following admission with Cr increasing from 2.27 > 6.26 over the past 5 days with minimal UO (Tong).  Hb fell from 7.7 > 6.5 improving to 8.1 following pRBC x1. LFTs have been improving (T bili 10.0 > 7.7,  > 67, ALT 57 > 27, ammonia 60 > < 9.)  MELD has incr (37 > 40)      On exam patient is alert and oriented.  Transfer requested for hepatology.  Transfer diagnosis hepatorenal syndrome, A/C liver failure, alcoholic cirrhosis    Instructions      Mejia Villegas-  Admit to Hospital Medicine  Upon patient arrival to floor, please send SecureChat to [facility] or call extension [phone] (if no answer, do NOT leave a callback number after the beep,  rather please send a SecureChat to [facility]), for Hospital Medicine admit team assignment and for additional admit orders for the patient.  Do not page the attending physician associated with the patient on arrival (this physician may not be on duty at the time of arrival).  Rather, always send a SecureChat to [facility] or call [phone] to reach the triage physician for orders and team assignment.

## 2024-11-07 PROBLEM — D64.9 ANEMIA: Status: ACTIVE | Noted: 2024-01-01

## 2024-11-07 PROBLEM — D63.8 ANEMIA, CHRONIC DISEASE: Status: ACTIVE | Noted: 2024-01-01

## 2024-11-07 PROBLEM — K76.82 HEPATIC ENCEPHALOPATHY: Status: ACTIVE | Noted: 2024-01-01

## 2024-11-07 PROBLEM — D53.9 MACROCYTIC ANEMIA: Status: ACTIVE | Noted: 2024-01-01

## 2024-11-07 PROBLEM — E46 MALNUTRITION: Status: ACTIVE | Noted: 2024-01-01

## 2024-11-07 PROBLEM — Z51.5 PALLIATIVE CARE ENCOUNTER: Status: ACTIVE | Noted: 2024-01-01

## 2024-11-07 PROBLEM — R33.8 ACUTE URINARY RETENTION: Status: ACTIVE | Noted: 2024-01-01

## 2024-11-07 NOTE — SUBJECTIVE & OBJECTIVE
Past Medical History:   Diagnosis Date    DONNA (acute kidney injury)     Alcoholic cirrhosis of liver     Thrombocytopenia        Past Surgical History:   Procedure Laterality Date    APPENDECTOMY         Review of patient's allergies indicates:  No Known Allergies    Current Facility-Administered Medications on File Prior to Encounter   Medication    [DISCONTINUED] 0.9%  NaCl infusion (for blood administration)    [DISCONTINUED] albumin human 25% bottle 25 g    [DISCONTINUED] albumin human 25% bottle 37.5 g    [DISCONTINUED] ALPRAZolam tablet 0.5 mg    [DISCONTINUED] lactulose 20 gram/30 mL solution Soln 30 g    [DISCONTINUED] midodrine tablet 10 mg    [DISCONTINUED] morphine injection 2 mg    [DISCONTINUED] naloxone 0.4 mg/mL injection 0.02 mg    [DISCONTINUED] octreotide injection 100 mcg    [DISCONTINUED] ondansetron injection 8 mg    [DISCONTINUED] sodium chloride 0.9% flush 10 mL    [DISCONTINUED] traMADoL tablet 50 mg     No current outpatient medications on file prior to encounter.     Family History    None       Tobacco Use    Smoking status: Every Day     Current packs/day: 0.25     Average packs/day: 0.3 packs/day for 40.0 years (10.0 ttl pk-yrs)     Types: Cigarettes    Smokeless tobacco: Not on file   Substance and Sexual Activity    Alcohol use: Not Currently     Comment: claims quit 9/2024    Drug use: Not on file    Sexual activity: Not on file     Review of Systems   Constitutional:  Positive for fatigue. Negative for chills and fever.   HENT:  Negative for postnasal drip and sore throat.    Respiratory:  Negative for cough, chest tightness, shortness of breath and wheezing.    Cardiovascular:  Positive for leg swelling. Negative for chest pain and palpitations.   Gastrointestinal:  Positive for abdominal distention. Negative for abdominal pain, blood in stool, diarrhea, nausea, rectal pain and vomiting.   Genitourinary:  Negative for dysuria, frequency, hematuria and urgency.   Musculoskeletal:   Negative for arthralgias, joint swelling and myalgias.   Skin:  Negative for rash.   Neurological:  Negative for tremors and seizures.   Psychiatric/Behavioral:  Negative for behavioral problems and confusion. The patient is not nervous/anxious.      Objective:     Vital Signs (Most Recent):  Temp: 97.5 °F (36.4 °C) (11/07/24 0406)  Pulse: 81 (11/07/24 0406)  Resp: 20 (11/07/24 0552)  BP: 127/78 (11/07/24 0547)  SpO2: 99 % (11/07/24 0406) Vital Signs (24h Range):  Temp:  [97.5 °F (36.4 °C)-98.5 °F (36.9 °C)] 97.5 °F (36.4 °C)  Pulse:  [65-84] 81  Resp:  [14-20] 20  SpO2:  [94 %-99 %] 99 %  BP: (114-132)/(53-80) 127/78     Weight: 76.7 kg (169 lb 1.5 oz)  Body mass index is 29.95 kg/m².     Physical Exam  Vitals reviewed.   Constitutional:       Appearance: She is ill-appearing. She is not diaphoretic.   HENT:      Head: Normocephalic and atraumatic.   Eyes:      General: Scleral icterus present.      Extraocular Movements: Extraocular movements intact.   Cardiovascular:      Rate and Rhythm: Normal rate.   Pulmonary:      Effort: Pulmonary effort is normal.   Abdominal:      General: There is distension.      Palpations: Abdomen is soft.   Musculoskeletal:         General: Swelling present.   Skin:     General: Skin is warm.   Neurological:      General: No focal deficit present.      Mental Status: She is alert. Mental status is at baseline.   Psychiatric:         Mood and Affect: Mood normal.                Significant Labs: All pertinent labs within the past 24 hours have been reviewed.    Significant Imaging: I have reviewed all pertinent imaging results/findings within the past 24 hours.

## 2024-11-07 NOTE — CONSULTS
"Mejia Villegas - Medical / Clinical  Nephrology  CONSULT Note      Patient Name: Rudolph Ojeda   MRN: [id number]   Current Provider: Joana Kerr MD  Primary Care Provider: Blaire Primary Doctor   Admission Date: 11/6/2024   Hospital Day: 1  Bed: 8093/8093 A  Principal Problem: Hepatorenal syndrome       SUBJECTIVE    Rudolph Ojeda is a 59 y.o. female ,is admitted on 11/6/2024  with past medical history of   tobacco use, cirrhosis secondary to EtOH abuse status post recent paracentesis 10/30/2024 per radiology with 5 L.    She was initially presented to Outside facility ([facility]) for BL lower limb edema and ascites. She had paracentesis there, and was started on HRS protocol including octreotide, albumin, lactulose and midodrine (10 mg Q 8 h). Her BP was initially ranged between 110-119. Her renal functions was found to have gotten worse  despite the treatment. Cr raised from 2.27 to 9.2 in 7 days. Her HB was low and she received one unit of PRBC.     Nephrology was consulted for the possible need of dialysis.      Review of Systems: Negative except for as stated in history of present illness    OBJECTIVE     Vitals:  BP (!) 120/57 (BP Location: Left arm, Patient Position: Lying)   Pulse 82   Temp 96.3 °F (35.7 °C) (Axillary)   Resp 16   Ht 5' 3" (1.6 m)   Wt 76.7 kg (169 lb 1.5 oz)   SpO2 99%   BMI 29.95 kg/m²    I/O last 3 completed shifts:  In: -   Out: 50 [Urine:50]   Net IO Since Admission: -50 mL [11/07/24 1212]    Physical Examination:  Constitutional: Chronically ill appearing. Complaining of abdominal pain.  HEENT: Atraumatic. Icteric  Chest:  Chest is clear BL. No Crackles. No wheezes  Cardiovascular:  S1+S2+0.   Abdominal: [place], distended abdomin with generalized tenderness  Extremities: Bilateral lower extremity edema 1+  No following commands.    LABS:  US: no hydronephrosis  CXR 11/1: [place] son the L lower zone - no previous CXR for comparison  Hep C AB - High  JORDAN titers " "1:80  Ascitic tap -      ASSESSMENT AND PLAN:    Rudolph Ojeda is a 59 y.o. female ,is admitted on 11/6/2024  with past medical history of   tobacco use, cirrhosis secondary to EtOH abuse status post recent paracentesis 10/30/2024 per radiology with 5 L .  Transfer from [facility] for hepatology eval. She is admitted with HRS - renals getting worse despite treatment    Nephrology  is consulted for the potential need for dialysis.      Impression:  DONNA stage 3 with PH of cirrhosis, had low BP  Baseline Creatinine: 1.64 on 9/11/24  Creatinine at time of consult: 9.2 (raised from 2.27 in 7 days)  Imaging- US kidney: no evidence of hydronephrosis   Etiology of DONNA: HRS vs ATN    Azotemia 57 (BL 20-30)    Hypotension     Anemia - Hb 7.6    Recommendations :   - Kindly check the urine lyte, UPCR, Urinalysis,   - Continue with the HRS treatment  - Repeat BMPs twice daily until the renal functions start improving  - No indications for the dialysis at this point - we will evaluate her daily for it.     Monitor serum chemistries daily, strict intake and output Qshift , daily weights if able.  Renal protective measures: Please adjust medications for reduced clearance  Avoid nephrotoxic medications (NSAID, IV contrast)  Avoid ACEi and ARBs in the setting of DONNA  Maintain MAP > 65     Transfuse for Hb <7    Thank you for allowing us to participate in the care of this patient.  Plan discussed with attending. Please call with any questions or concerns.           Will Joyce MD.  Clinical Nephrology Fellow, PGY-4  [facility] [address]           MEDICATIONS & LABS         albumin human 25%  25 g Intravenous BID    lactulose  30 g Oral Daily    midodrine  10 mg Oral Q8H    octreotide  100 mcg Subcutaneous Q8H     No current outpatient medications    Relevant Data:  Trend: No results found for: "CYSTATINCSER"  Trend:   Lab Results   Component Value Date    BUN 57 (H) 11/07/2024    BUN 60 " "(H) 11/06/2024    BUN 57 (H) 11/05/2024          No results found for: "HCO3"  Trend:   Vitals:    11/07/24 0552 11/07/24 0712 11/07/24 1114 11/07/24 1144   BP:  (!) 120/59 (!) 120/57    BP Location:  Left arm Left arm    Patient Position:  Lying Lying    Pulse:  84 82    Resp: 20 18 16    Temp:  97.9 °F (36.6 °C) 96.3 °F (35.7 °C)    TempSrc:  Oral Axillary    SpO2:  95% 99%    Weight:    76.7 kg (169 lb 1.5 oz)   Height:    5' 3" (1.6 m)        I/O last 3 completed shifts:  In: -   Out: 50 [Urine:50]   Net IO Since Admission: -50 mL [11/07/24 1212]  Trend: No results found for: "PHOSPHORUS"  Trend:   Lab Results   Component Value Date    HGB 7.6 (L) 11/07/2024    HGB 8.2 (L) 11/06/2024    HGB 8.1 (L) 11/05/2024          Trend:   Calcium   Date Value Ref Range Status   11/07/2024 8.8 8.7 - 10.5 mg/dL Final   11/06/2024 8.9 8.4 - 10.2 mg/dL Final   11/05/2024 8.8 8.4 - 10.2 mg/dL Final     Albumin   Date Value Ref Range Status   11/07/2024 3.2 (L) 3.5 - 5.2 g/dL Final   11/06/2024 2.9 (L) 3.5 - 5.2 g/dL Final       MEDICAL HISTORY    Past Medical History:  Past Medical History:   Diagnosis Date    DONNA (acute kidney injury)     Alcoholic cirrhosis of liver     Thrombocytopenia        Past Surgical History:  Past Surgical History:   Procedure Laterality Date    APPENDECTOMY         Family History:   No family history on file.   Review of patient's allergies indicates:  No Known Allergies  Social History     Socioeconomic History    Marital status: Single   Tobacco Use    Smoking status: Every Day     Current packs/day: 0.25     Average packs/day: 0.3 packs/day for 40.0 years (10.0 ttl pk-yrs)     Types: Cigarettes   Substance and Sexual Activity    Alcohol use: Not Currently     Comment: claims quit 9/2024     Social Drivers of Health     Food Insecurity: Patient Declined (11/4/2024)    Hunger Vital Sign     Worried About Running Out of Food in the Last Year: Patient declined     Ran Out of Food in the Last Year: " Patient declined   Transportation Needs: Patient Declined (11/4/2024)    TRANSPORTATION NEEDS     Transportation : Patient declined   Stress: Patient Declined (11/4/2024)    [employer] - Occupational Stress Questionnaire     Feeling of Stress : Patient declined   Housing Stability: Patient Declined (11/4/2024)    Housing Stability Vital Sign     Unable to Pay for Housing in the Last Year: Patient declined     Homeless in the Last Year: Patient declined

## 2024-11-07 NOTE — PLAN OF CARE
CM unable to obtain discharge planning assessment due to patient soundly sleeping and when aroused did not want to answer questions. CM left message for patient's sister Francine to obtain information.      Laurence Reeves RN     [phone]

## 2024-11-07 NOTE — HPI
"As per H&P, "Rudolph Ojeda is a 59 y.o. female with a PMHx of alcoholic cirrhosis status post recent paracentesis 10/30/2024 (5 L removed) who was being seen at [facility] [place] where she presented with bilateral lower extremity edema with lower extremity ultrasound negative for DVT. Patient transferred secondary to acute kidney injury and bilateral pleural effusions. Prior to transfer, patient denying fevers, dyspnea, nausea, vomiting or increasing abdominal girth status post paracentesis. Patient admitted to [facility] with probable hepatorenal syndrome w/ gradually worsening ascites s/p para a few days prior. Nephrology and Gastroenterology were consulted. Pt claims her last alcohol intake was about 2-1/2 months ago. She was started on hepatorenal protocol. She was also started on Lasix and Aldactone. Bilateral renal ultrasound studies showed no evidence of hydronephrosis. During stay at OSH, hemoglobin dropped and renal function continued to worsen despite treatment. After discussion with Gastroenterology and Nephrology, decision made to transfer patient to a tertiary care center for hepatology evaluation. [facility] [place] [facility], patient alert and oriented and HDS. Pt admitted to hospital medicine w/ plans for Hepatology and Nephrology consultation for further workup and management of hepatorenal syndrome and liver failure."    Palliative Medicine was consulted by primary, Dr. Kerr, for goals of care and advanced care planning discussions.  "

## 2024-11-07 NOTE — HOSPITAL COURSE
Patient is a 51-year-old female with history of alcohol cirrhosis s/p paracentesis on 10/30/2024 (5 L removed) who was admitted at [facility] after presenting with bilateral lower extremity edema (ultrasound negative for DVT).  Patient with worsening renal function and concern for HRS, so she was transferred to [facility].  Nephrology consulted for possible need for dialysis, they state there is no knee at this time we will continue to follow.  Hepatology consulted and recommended infectious workup given the patient's altered mental status upon arrival.  SBP prophylaxis with ceftriaxone.  Patient's ammonia elevated to 129, which is the likely cause of her encephalopathy.  11/8 Patient with no improvement in her mental status despite bowel movements.  Lactulose change from oral to enema.  Bedside ultrasound showed a small amount of fluid the patient's abdomen, not conducive to bedside diagnostic paracentesis.  Hepatology recommended transfer to  liver team, so patient will be transferred to their service tomorrow morning (11/9).  Patient with hemoglobin of 6.8, transfuse 1 unit pRBCs.  CT head ordered to further evaluate her mental status change, but unable to remain still for imaging.  Avoiding sedating medications given her mental status change.

## 2024-11-07 NOTE — CONSULTS
Palliative Medicine consult acknowledged. I attempted to see patient, however, she is sleeping soundly. When I woke her up, she would drift to sleep in the middle of the conversation. Unsuccessful attempts made to reach patient's sister, Francine Ojeda [phone]. I called 3x times, now awaiting a returned phone call. Unable to complete consult at this time. I will re-attempt to see patient on 11/8/24. Primary team notified.    Thank you for consulting Palliative Medicine. I will continue to follow.          Rosa De Leon, EUNICE, APRN, FNP-C  Department of Palliative Medicine  [facility]  [phone]

## 2024-11-07 NOTE — CONSULTS
"  Mejia Villegas - Telemetry Stepdown  Adult Nutrition  Consult Note    SUMMARY     Recommendations    Continue Renal diet.  Nov[employer] ONS added TID.  RD to monitor & follow-up.    Goals: Meet % EEN, EPN by RD f/u date  Nutrition Goal Status: new  Communication of RD Recs: other (comment) (POC)    Assessment and Plan    Nutrition Problem:  Increased nutrient needs    Related to (etiology):   Physiological causes    Signs and Symptoms (as evidenced by):   Cirrhosis     Interventions(treatment strategy):  Collaboration of nutrition care w/ other providers    Nutrition Diagnosis Status:   New     Malnutrition Assessment    Subcutaneous Fat (Malnutrition): mild depletion  Muscle Mass (Malnutrition): mild depletion  Fluid Accumulation (Malnutrition): mild   Orbital Region (Subcutaneous Fat Loss): mild depletion  Upper Arm Region (Subcutaneous Fat Loss): mild depletion   [place] Region (Muscle Loss): mild depletion  Clavicle Bone Region (Muscle Loss): mild depletion  Dorsal Hand (Muscle Loss): mild depletion     Reason for Assessment    Reason For Assessment: consult  Diagnosis: other (see comments) (Hepatorenal syndrome; PMH: Etoh abuse, Cirrhosis)    General Information Comments: Disoriented during visit, unsure of energy intake PTA/currently (diet advanced yesterday). Unsure of UBW (no wt hx in chart). NFPE complete w/ mild wasting found, not enough information to assess for malnutrition. DONNA noted. Palliative care consulted.   Nutrition Discharge Planning: Adequate PO intake  SDOH: Unable to assess at this time.     Nutrition/Diet History    Food Allergies: NKFA  Factors Affecting Nutritional Intake: behavioral (mealtime)    Anthropometrics    Temp: 96.3 °F (35.7 °C)  Height Method: Stated  Height: 5' 3" (160 cm)  Height (inches): 63 in  Weight: 76.7 kg (169 lb 1.5 oz)  Weight (lb): 169.09 lb  Ideal Body Weight (IBW), Female: 115 lb  % Ideal Body Weight, Female (lb): 147.03 %  BMI (Calculated): 30  BMI Grade: 30 - " 34.9- obesity - grade I  Usual Body Weight (UBW), kg:  (BRIANDA)    Lab/Procedures/Meds    Pertinent Labs Reviewed: reviewed  Pertinent Labs Comments: Creat 9.2, GFR 4.6  Pertinent Medications Reviewed: reviewed  Pertinent Medications Comments: Lactulose    Estimated/Assessed Needs    Weight Used For Calorie Calculations: 76.7 kg (169 lb 1.5 oz)    Energy Calorie Requirements (kcal): 1579 kcal/d  Energy Need Method: [place]-St Jeor (1.2 PAL)    Protein Requirements: 92 g/d (1.2 g/kg)  Weight Used For Protein Calculations: 76.7 kg (169 lb 1.5 oz)    Estimated Fluid Requirement Method: other (see comments) (Per MD)  RDA Method (mL): 1579    Nutrition Prescription Ordered    Current Diet Order: Renal    Evaluation of Received Nutrient/Fluid Intake    I/O: -5L since admit    Comments: LBM: 11/6    Tolerance: tolerating    Nutrition Risk    Level of Risk/Frequency of Follow-up:  (1x/week)     Monitor and Evaluation    Food and Nutrient Intake: food and beverage intake, energy intake  Food and Nutrient Adminstration: diet order  Physical Activity and Function: nutrition-related ADLs and IADLs  Anthropometric Measurements: weight, weight change  Biochemical Data, Medical Tests and Procedures: inflammatory profile, lipid profile, glucose/endocrine profile, gastrointestinal profile, electrolyte and renal panel  Nutrition-Focused Physical Findings: overall appearance     Nutrition Follow-Up    RD Follow-up?: Yes

## 2024-11-07 NOTE — PLAN OF CARE
Problem: Adult Inpatient Plan of Care  Goal: Plan of Care Review  Outcome: Progressing  Goal: Patient-Specific Goal (Individualized)  Outcome: Progressing  Goal: Absence of Hospital-Acquired Illness or Injury  Outcome: Progressing  Goal: Optimal Comfort and Wellbeing  Outcome: Progressing  Goal: Readiness for Transition of Care  Outcome: Progressing     Problem: Acute Kidney Injury/Impairment  Goal: Fluid and Electrolyte Balance  Outcome: Progressing  Goal: Improved Oral Intake  Outcome: Progressing  Goal: Effective Renal Function  Outcome: Progressing     Problem: Skin Injury Risk Increased  Goal: Skin Health and Integrity  Outcome: Progressing     Problem: Infection  Goal: Absence of Infection Signs and Symptoms  Outcome: Progressing  Intervention: Prevent or Manage Infection  Flowsheets (Taken 11/7/2024 1713)  Fever Reduction/Comfort Measures:   lightweight bedding   lightweight clothing  Infection Management: aseptic technique maintained     Problem: Coping Ineffective  Goal: Effective Coping  Outcome: Progressing  Intervention: Support and Enhance Coping Strategies  Flowsheets (Taken 11/7/2024 1713)  Supportive Measures:   active listening utilized   verbalization of feelings encouraged  Environmental Support: calm environment promoted     POC in progress. Pt oriented to self/year. Delay in response.  Refused meals. Pt is agitation and restless. Abdomen distended/taut. Med team aware. Frequent safety checks performed. Bed in lowest position. Bed alarm.

## 2024-11-07 NOTE — HPI
Mrs Ojeda is a 59F w/ hx of alcoholic cirrhosis (decompensated with ascites) status post recent paracentesis 10/30/2024 (5 L removed) who was being seen at [facility] as transfer from [facility] where she presented with bilateral lower extremity edema, bilateral pleural effusions and and acute kidney injury. DONNA continued to get worse at [facility] concerning for hepatorenal syndrome w/ gradually worsening ascites. Nephrology and Gastroenterology were consulted. Pt claims her last alcohol intake was about 2-1/2 months ago. She was started on hepatorenal protocol with albumin, midodrine and octreotide (unsure if terlipressin available at OSH). She was also started on Lasix and Aldactone. Bilateral renal ultrasound studies showed no evidence of hydronephrosis.     Patient very tired on exam and did not participate in answering questions.

## 2024-11-07 NOTE — ASSESSMENT & PLAN NOTE
59-y/o w/ alcoholic cirrhosis, ascites, HCV originally transferred to [facility] from [facility] on 11/01 with acute kidney injury following paracentesis (5 L) with c/f hepatorenal syndrome. Patient reports that her last alcohol intake was several months ago. On admission /52, HR 95, RR 14, SpO2 98%, T 97.6°. Labs (11/2) WBC 15.5, Hb 7.7, Plts 55, INR 2.5, Na 135, K 3.3, Cr 2.27, T bili 10.0, , ALT 57, ammonia 60. BL renal US showed no evidence of hydronephrosis or acute changes.     Patient started on hepatorenal protocol (octreotide, albumin), lactulose and midodrine (10 mg Q 8 h) with SBP mostly in the mid-teens. Renal function has continued to decline following admission with Cr increasing from 2.27 > 6.26 over the past 5 days with minimal UO (Tong). Hb fell from 7.7 > 6.5 improving to 8.1 following pRBC x1. LFTs have been improving (T bili 10.0 > 7.7,  > 67, ALT 57 > 27, ammonia 60 > <9.) MELD has incr (37 > 40). Transfer requested for hepatology for further w/u and eval of A/C liver failure on background of alcoholic cirrhosis, and plans for continued Nephrology input for mx of hepatorenal syndrome.    - Continue HRS treatment   - Emerging potential for HD needs for which Nephro was consulted upon pt [facility] [place] [facility]

## 2024-11-07 NOTE — PROGRESS NOTES
Pt arrived to the unit drowsy but easily arouse.  Anni signs stable. ARIELLA WYMAN informed of pts arrival to the unit.

## 2024-11-07 NOTE — SUBJECTIVE & OBJECTIVE
Interval History: Palliative Medicine introduced to patient and family. Goals of care discussions initiated.    Past Medical History:   Diagnosis Date    DONNA (acute kidney injury)     Alcoholic cirrhosis of liver     Thrombocytopenia        Past Surgical History:   Procedure Laterality Date    APPENDECTOMY         Review of patient's allergies indicates:  No Known Allergies    Medications:  Continuous Infusions:  Scheduled Meds:   albumin human 25%  25 g Intravenous BID    lactulose  200 g Rectal BID    meropenem IV (PEDS and ADULTS)  1 g Intravenous Q8H    midodrine  10 mg Oral Q8H    morphine  2 mg Intravenous Once    octreotide  100 mcg Subcutaneous Q8H    rifAXImin  550 mg Oral BID    vancomycin (VANCOCIN) IV (PEDS and ADULTS)  750 mg Intravenous Once     PRN Meds:  Current Facility-Administered Medications:     0.9%  NaCl infusion (for blood administration), , Intravenous, Q24H PRN    dextrose 10%, 12.5 g, Intravenous, PRN    dextrose 10%, 25 g, Intravenous, PRN    glucagon (human recombinant), 1 mg, Intramuscular, PRN    glucose, 16 g, Oral, PRN    glucose, 24 g, Oral, PRN    naloxone, 0.02 mg, Intravenous, PRN    ondansetron, 8 mg, Intravenous, Q6H PRN    sodium chloride 0.9%, 10 mL, Intravenous, Q12H PRN    Flushing PICC/Midline Protocol, , , Until Discontinued **AND** sodium chloride 0.9%, 10 mL, Intravenous, Q12H PRN    Pharmacy to dose Vancomycin consult, , , Once **AND** vancomycin - pharmacy to dose, , Intravenous, pharmacy to manage frequency    Family History    None       Tobacco Use    Smoking status: Every Day     Current packs/day: 0.25     Average packs/day: 0.3 packs/day for 40.0 years (10.0 ttl pk-yrs)     Types: Cigarettes    Smokeless tobacco: Not on file   Substance and Sexual Activity    Alcohol use: Not Currently     Comment: claims quit 9/2024    Drug use: Not on file    Sexual activity: Not on file       Review of Systems   Unable to perform ROS: Acuity of condition     Objective:  "    Vital Signs (Most Recent):  Temp: 98 °F (36.7 °C) (11/08/24 1548)  Pulse: 89 (11/08/24 1548)  Resp: 18 (11/08/24 1548)  BP: (!) 151/76 (11/08/24 1548)  SpO2: (!) 88 % (11/08/24 1548) Vital Signs (24h Range):  Temp:  [96 °F (35.6 °C)-98 °F (36.7 °C)] 98 °F (36.7 °C)  Pulse:  [] 89  Resp:  [16-18] 18  SpO2:  [88 %-99 %] 88 %  BP: (114-151)/(59-76) 151/76     Weight: 76.7 kg (169 lb 1.5 oz)  Body mass index is 29.95 kg/m².       Physical Exam  Vitals and nursing note reviewed.   Constitutional:       General: She is sleeping. She is not in acute distress.     Appearance: She is obese. She is ill-appearing and toxic-appearing.   HENT:      Head: Normocephalic and atraumatic.      Mouth/Throat:      Mouth: Mucous membranes are dry.   Eyes:      Extraocular Movements: Extraocular movements intact.   Cardiovascular:      Rate and Rhythm: Normal rate.   Pulmonary:      Effort: Pulmonary effort is normal.   Abdominal:      General: There is distension.   Skin:     General: Skin is warm and dry.   Neurological:      Mental Status: She is easily aroused.      Motor: Weakness present.            Review of Symptoms      Symptom Assessment (ESAS 0-10 Scale)  Unable to complete assessment due to Acuity of condition         Pain Assessment in Advanced Demential Scale (PAINAD)   Breathing - Independent of vocalization:  0  Negative vocalization:  0  Facial expression:  0  Body language:  0  Consolability:  0  Total:  0    Performance Status:  40    Living Arrangements:  Lives with family    Psychosocial/Cultural:   See Palliative Psychosocial Note: Yes  - not   - did not work  - has 6 adult children, 11 grandchildren, 1 great-grand child  - the youngest sibling of 6 brothers and sisters  - commonly known to family as the "life of the party"  - enjoyed spending time with family, friends, laughing, and music  - loves to watch "True Crime" shows  - participated in annual "sister trips" with her sisters. Patient last " "traveled to [place] with her sisters on the last sister trip    **Primary  to Follow**  Palliative Care  Consult: Yes    Spiritual:  F - Antoinette and Belief:  [employer]  A - Address in Care:  [place] support offered.        Advance Care Planning   Advance Directives:   Living Will: No    LaPOST: No    Do Not Resuscitate Status: No    Medical Power of : No      Decision Making:  Patient unable to communicate due to disease severity/cognitive impairment and Family answered questions  Goals of Care: The family endorses that what is most important right now is to focus on coordinating discussion between family and hepatology team to gather more information regarding prognosis and plans for liver transplant evaluation process    Accordingly, we have decided that the best plan to meet the patient's goals includes continuing with treatment and following up with hepatology team regarding patient's transplant evaluation status         Significant Labs: All pertinent labs within the past 24 hours have been reviewed.  CBC:   Recent Labs   Lab 11/08/24  0849   WBC 11.68   HGB 6.8*   HCT 20.7*   *   PLT 34*     BMP:  Recent Labs   Lab 11/08/24  0849   GLU 91      K 3.3*   CL 98   CO2 18*   BUN 59*   CREATININE 9.6*   CALCIUM 9.7     LFT:  Lab Results   Component Value Date    AST 88 (H) 11/08/2024    ALKPHOS 50 11/08/2024    BILITOT 7.4 (H) 11/08/2024     Albumin:   Albumin   Date Value Ref Range Status   11/08/2024 3.6 3.5 - 5.2 g/dL Final     Protein:   Total Protein   Date Value Ref Range Status   11/08/2024 5.4 (L) 6.0 - 8.4 g/dL Final     Lactic acid:   No results found for: "LACTATE"    Significant Imaging: I have reviewed all pertinent imaging results/findings within the past 24 hours.      US Liver with Doppler 11/7/24  Impression:     Cirrhotic liver morphology.  No focal hepatic lesions.     Sequela of portal hypertension including reversal of flow about the main and right " portal vein branches, bidirectional flow in the left portal vein branch, reversal of flow in the splenic vein, bidirectional flow of flow in the superior mesenteric vein, small volume ascites, and patent umbilical vein.     Bilateral pleural effusions.     Gallbladder sludge without evidence for cholecystitis.     US Retroperitoneal 11/2/24  Impression:     1. The kidneys demonstrate no hydronephrosis.  2. Ascites.

## 2024-11-07 NOTE — H&P
[facility] - Telemetry [facility] Medicine  History & Physical    Patient Name: Rudolph Ojeda  MRN: [id number]  Patient Class: IP- Inpatient  Admission Date: 11/6/2024  Attending Physician: Pushpa Portillo MD   Primary Care Provider: Blaire Primary Doctor         Patient information was obtained from patient and ER records.     Subjective:     Principal Problem:Hepatorenal syndrome       HPI: 59F w/ hx of alcoholic cirrhosis status post recent paracentesis 10/30/2024 (5 L removed) who was being seen at [facility]  as transfer from [facility] where she presented with bilateral lower extremity edema with lower extremity ultrasound negative for DVT.  Patient transferred secondary to acute kidney injury and bilateral pleural effusions.  Prior to transfer, patient denying fevers, dyspnea, nausea, vomiting or increasing abdominal girth status post paracentesis. Patient admitted to [facility] with probable hepatorenal syndrome w/ gradually worsening ascites s/p para a few days prior.  Nephrology and Gastroenterology were consulted.  Pt claims her last alcohol intake was about 2-1/2 months ago.  She was started on hepatorenal protocol.  She was also started on Lasix and Aldactone. Bilateral renal ultrasound studies showed no evidence of hydronephrosis. During stay at OSH, hemoglobin dropped and renal function continued to worsen despite treatment. After discussion with Gastroenterology and Nephrology, decision made to transfer patient to a tertiary care center for hepatology evaluation. On arrival to [facility], patient alert and oriented and HDS. Pt admitted to hospital medicine w/ plans for Hepatology and Nephrology consultation for further workup and management of hepatorenal syndrome and liver failure    Past Medical History:   Diagnosis Date    DONNA (acute kidney injury)     Alcoholic cirrhosis of liver     Thrombocytopenia        Past Surgical History:   Procedure Laterality Date    APPENDECTOMY          Review of patient's allergies indicates:  No Known Allergies    Current Facility-Administered Medications on File Prior to Encounter   Medication    [DISCONTINUED] 0.9%  NaCl infusion (for blood administration)    [DISCONTINUED] albumin human 25% bottle 25 g    [DISCONTINUED] albumin human 25% bottle 37.5 g    [DISCONTINUED] ALPRAZolam tablet 0.5 mg    [DISCONTINUED] lactulose 20 gram/30 mL solution Soln 30 g    [DISCONTINUED] midodrine tablet 10 mg    [DISCONTINUED] morphine injection 2 mg    [DISCONTINUED] naloxone 0.4 mg/mL injection 0.02 mg    [DISCONTINUED] octreotide injection 100 mcg    [DISCONTINUED] ondansetron injection 8 mg    [DISCONTINUED] sodium chloride 0.9% flush 10 mL    [DISCONTINUED] traMADoL tablet 50 mg     No current outpatient medications on file prior to encounter.     Family History    None       Tobacco Use    Smoking status: Every Day     Current packs/day: 0.25     Average packs/day: 0.3 packs/day for 40.0 years (10.0 ttl pk-yrs)     Types: Cigarettes    Smokeless tobacco: Not on file   Substance and Sexual Activity    Alcohol use: Not Currently     Comment: claims quit 9/2024    Drug use: Not on file    Sexual activity: Not on file     Review of Systems   Constitutional:  Positive for fatigue. Negative for chills and fever.   HENT:  Negative for postnasal drip and sore throat.    Respiratory:  Negative for cough, chest tightness, shortness of breath and wheezing.    Cardiovascular:  Positive for leg swelling. Negative for chest pain and palpitations.   Gastrointestinal:  Positive for abdominal distention. Negative for abdominal pain, blood in stool, diarrhea, nausea, rectal pain and vomiting.   Genitourinary:  Negative for dysuria, frequency, hematuria and urgency.   Musculoskeletal:  Negative for arthralgias, joint swelling and myalgias.   Skin:  Negative for rash.   Neurological:  Negative for tremors and seizures.   Psychiatric/Behavioral:  Negative for behavioral problems and  confusion. The patient is not nervous/anxious.      Objective:     Vital Signs (Most Recent):  Temp: 97.5 °F (36.4 °C) (11/07/24 0406)  Pulse: 81 (11/07/24 0406)  Resp: 20 (11/07/24 0552)  BP: 127/78 (11/07/24 0547)  SpO2: 99 % (11/07/24 0406) Vital Signs (24h Range):  Temp:  [97.5 °F (36.4 °C)-98.5 °F (36.9 °C)] 97.5 °F (36.4 °C)  Pulse:  [65-84] 81  Resp:  [14-20] 20  SpO2:  [94 %-99 %] 99 %  BP: (114-132)/(53-80) 127/78     Weight: 76.7 kg (169 lb 1.5 oz)  Body mass index is 29.95 kg/m².     Physical Exam  Vitals reviewed.   Constitutional:       Appearance: She is ill-appearing. She is not diaphoretic.   HENT:      Head: Normocephalic and atraumatic.   Eyes:      General: Scleral icterus present.      Extraocular Movements: Extraocular movements intact.   Cardiovascular:      Rate and Rhythm: Normal rate.   Pulmonary:      Effort: Pulmonary effort is normal.   Abdominal:      General: There is distension.      Palpations: Abdomen is soft.   Musculoskeletal:         General: Swelling present.   Skin:     General: Skin is warm.   Neurological:      General: No focal deficit present.      Mental Status: She is alert. Mental status is at baseline.   Psychiatric:         Mood and Affect: Mood normal.                Significant Labs: All pertinent labs within the past 24 hours have been reviewed.    Significant Imaging: I have reviewed all pertinent imaging results/findings within the past 24 hours.  Assessment/Plan:     * Hepatorenal syndrome  59-y/o w/ alcoholic cirrhosis, ascites, HCV originally transferred to [facility] from [facility] on 11/01 with acute kidney injury following paracentesis (5 L) with c/f hepatorenal syndrome. Patient reports that her last alcohol intake was several months ago. On admission /52, HR 95, RR 14, SpO2 98%, T 97.6°. Labs (11/2) WBC 15.5, Hb 7.7, Plts 55, INR 2.5, Na 135, K 3.3, Cr 2.27, T bili 10.0, , ALT 57, ammonia 60. BL renal US showed no  evidence of hydronephrosis or acute changes.     Patient started on hepatorenal protocol (octreotide, albumin), lactulose and midodrine (10 mg Q 8 h) with SBP mostly in the mid-teens. Renal function has continued to decline following admission with Cr increasing from 2.27 > 6.26 over the past 5 days with minimal UO (Tong). Hb fell from 7.7 > 6.5 improving to 8.1 following pRBC x1. LFTs have been improving (T bili 10.0 > 7.7,  > 67, ALT 57 > 27, ammonia 60 > <9.) MELD has incr (37 > 40). Transfer requested for hepatology for further w/u and eval of A/C liver failure on background of alcoholic cirrhosis, and plans for continued Nephrology input for mx of hepatorenal syndrome.    - Continue HRS treatment   - Emerging potential for HD needs for which Nephro was consulted upon [place] [facility]    Anemia  Anemia is likely due to chronic disease due to Chronic liver disease. Most recent hemoglobin and hematocrit are listed below.  Recent Labs     11/05/24 0719 11/06/24 0751 11/07/24  0330   HGB 8.1* 8.2* 7.6*   HCT 24.2* 24.8* 23.5*     Plan  - Monitor serial CBC: Daily  - Transfuse PRBC if patient becomes hemodynamically unstable, symptomatic or H/H drops below 7/21.  - Patient has received 1 units of PRBCs on 11/4  - Patient's anemia is currently stable  - Blood consent obtained upon [place] [facility]    Malnutrition  RD consulted      Hepatitis C        DONNA (acute kidney injury)  Most likely due to hepatorenal syndrome.  Most recent creatinine and eGFR are listed below.  Recent Labs     11/05/24 0719 11/06/24 0752 11/07/24  0330   CREATININE 6.26* 7.19* 9.2*   EGFRNORACEVR 7* 6* 4.6*     - Per Nephro following pt at OSH, avoid using diuretics to manage the ascites (aldactone being held)  - See HRS for remainder of plan    ACP (advance care planning)  Palliative consulted at OSH   - Consult placed upon arrival [place] [facility]      Ascites due to alcoholic cirrhosis  See Hepatorenal Syndrome  - Hepatology  consulted        VTE Risk Mitigation (From admission, onward)           Ordered     IP VTE HIGH RISK PATIENT  Once         11/06/24 2150     Place sequential compression device  Until discontinued         11/06/24 2150                       Elgin Rod MD  Department of Hospital Medicine  [facility] - Telemetry Stepdown

## 2024-11-07 NOTE — SUBJECTIVE & OBJECTIVE
Review of Systems   Unable to perform ROS: Mental status change       Past Medical History:   Diagnosis Date    DONNA (acute kidney injury)     Alcoholic cirrhosis of liver     Thrombocytopenia        Past Surgical History:   Procedure Laterality Date    APPENDECTOMY         Family history of liver disease: No    Review of patient's allergies indicates:  No Known Allergies      Tobacco Use    Smoking status: Every Day     Current packs/day: 0.25     Average packs/day: 0.3 packs/day for 40.0 years (10.0 ttl pk-yrs)     Types: Cigarettes    Smokeless tobacco: Not on file   Substance and Sexual Activity    Alcohol use: Not Currently     Comment: claims quit 9/2024    Drug use: Not on file    Sexual activity: Not on file       No medications prior to admission.       Objective:     Vital Signs (Most Recent):  Temp: 97.9 °F (36.6 °C) (11/07/24 0712)  Pulse: 84 (11/07/24 0712)  Resp: 18 (11/07/24 0712)  BP: (!) 120/59 (11/07/24 0712)  SpO2: 95 % (11/07/24 0712) Vital Signs (24h Range):  Temp:  [97.5 °F (36.4 °C)-98.5 °F (36.9 °C)] 97.9 °F (36.6 °C)  Pulse:  [65-84] 84  Resp:  [14-20] 18  SpO2:  [94 %-99 %] 95 %  BP: (114-132)/(53-80) 120/59     Weight: 76.7 kg (169 lb 1.5 oz) (11/07/24 0006)  Body mass index is 29.95 kg/m².       Physical Exam  Constitutional:       Appearance: She is ill-appearing.      Comments: Lethargic    HENT:      Head: Normocephalic and atraumatic.   Eyes:      General: Scleral icterus present.   Abdominal:      General: There is distension.      Palpations: Abdomen is soft.      Tenderness: There is no abdominal tenderness. There is no guarding.   Musculoskeletal:         General: Swelling present.      Right lower leg: Edema present.      Left lower leg: Edema present.      Comments: Diffuse edema    Skin:     General: Skin is warm and dry.      Capillary Refill: Capillary refill takes less than 2 seconds.   Neurological:      Mental Status: She is disoriented.      Motor: No weakness.             MELD 3.0: 35 at 11/7/2024  3:30 AM  MELD-Na: 35 at 11/7/2024  3:30 AM  Calculated from:  Serum Creatinine: 9.2 mg/dL (Using max of 3 mg/dL) at 11/7/2024  3:30 AM  Serum Sodium: 136 mmol/L at 11/7/2024  3:30 AM  Total Bilirubin: 6.3 mg/dL at 11/7/2024  3:30 AM  Serum Albumin: 3.2 g/dL at 11/7/2024  3:30 AM  INR(ratio): 2.2 at 11/7/2024  3:30 AM  Age at listing (hypothetical): 59 years  Sex: Female at 11/7/2024  3:30 AM      Significant Labs:  Labs within the past month have been reviewed.    Significant Imaging:  Reviewed

## 2024-11-07 NOTE — CONSULTS
DIONTES attempted PIV access.     Pt uncooperative and would not stay still for insertion even with assistance by second PICC team RN.     Insertion attempt abandoned for PICC team staff safety while handling sharps.

## 2024-11-07 NOTE — CONSULTS
[facility] - Telemetry Stepdown  Hepatology  Consult Note    Patient Name: Rudolph Ojeda  MRN: [id number]  Admission Date: 11/6/2024  Hospital Length of Stay: 1 days  Attending Provider: Joana Kerr MD   Primary Care Physician: Blaire, Primary Doctor  Principal Problem:Hepatorenal syndrome    Inpatient consult to Hepatology  Consult performed by: Jenna Muñiz MD  Consult ordered by: Elgin Rod MD        Subjective:     Transplant status: No    HPI:  Mrs Ojeda is a 59F w/ hx of alcoholic cirrhosis (decompensated with ascites) status post recent paracentesis 10/30/2024 (5 L removed) who was being seen at [facility] as transfer from [facility] where she presented with bilateral lower extremity edema, bilateral pleural effusions and and acute kidney injury. DONNA continued to get worse at [facility] concerning for hepatorenal syndrome w/ gradually worsening ascites. Nephrology and Gastroenterology were consulted. Pt claims her last alcohol intake was about 2-1/2 months ago. She was started on hepatorenal protocol with albumin, midodrine and octreotide (unsure if terlipressin available at OSH). She was also started on Lasix and Aldactone. Bilateral renal ultrasound studies showed no evidence of hydronephrosis.     Patient very tired on exam and did not participate in answering questions.     Review of Systems   Unable to perform ROS: Mental status change       Past Medical History:   Diagnosis Date    DONNA (acute kidney injury)     Alcoholic cirrhosis of liver     Thrombocytopenia        Past Surgical History:   Procedure Laterality Date    APPENDECTOMY         Family history of liver disease: No    Review of patient's allergies indicates:  No Known Allergies      Tobacco Use    Smoking status: Every Day     Current packs/day: 0.25     Average packs/day: 0.3 packs/day for 40.0 years (10.0 ttl pk-yrs)     Types: Cigarettes    Smokeless tobacco: Not on file   Substance and Sexual Activity    Alcohol  use: Not Currently     Comment: claims quit 9/2024    Drug use: Not on file    Sexual activity: Not on file       No medications prior to admission.       Objective:     Vital Signs (Most Recent):  Temp: 97.9 °F (36.6 °C) (11/07/24 0712)  Pulse: 84 (11/07/24 0712)  Resp: 18 (11/07/24 0712)  BP: (!) 120/59 (11/07/24 0712)  SpO2: 95 % (11/07/24 0712) Vital Signs (24h Range):  Temp:  [97.5 °F (36.4 °C)-98.5 °F (36.9 °C)] 97.9 °F (36.6 °C)  Pulse:  [65-84] 84  Resp:  [14-20] 18  SpO2:  [94 %-99 %] 95 %  BP: (114-132)/(53-80) 120/59     Weight: 76.7 kg (169 lb 1.5 oz) (11/07/24 0006)  Body mass index is 29.95 kg/m².       Physical Exam  Constitutional:       Appearance: She is ill-appearing.      Comments: Lethargic    HENT:      Head: Normocephalic and atraumatic.   Eyes:      General: Scleral icterus present.   Abdominal:      General: There is distension.      Palpations: Abdomen is soft.      Tenderness: There is no abdominal tenderness. There is no guarding.   Musculoskeletal:         General: Swelling present.      Right lower leg: Edema present.      Left lower leg: Edema present.      Comments: Diffuse edema    Skin:     General: Skin is warm and dry.      Capillary Refill: Capillary refill takes less than 2 seconds.   Neurological:      Mental Status: She is disoriented.      Motor: No weakness.            MELD 3.0: 35 at 11/7/2024  3:30 AM  MELD-Na: 35 at 11/7/2024  3:30 AM  Calculated from:  Serum Creatinine: 9.2 mg/dL (Using max of 3 mg/dL) at 11/7/2024  3:30 AM  Serum Sodium: 136 mmol/L at 11/7/2024  3:30 AM  Total Bilirubin: 6.3 mg/dL at 11/7/2024  3:30 AM  Serum Albumin: 3.2 g/dL at 11/7/2024  3:30 AM  INR(ratio): 2.2 at 11/7/2024  3:30 AM  Age at listing (hypothetical): 59 years  Sex: Female at 11/7/2024  3:30 AM      Significant Labs:  Labs within the past month have been reviewed.    Significant Imaging:  Reviewed       Assessment/Plan:       MELD 3.0: 35 at 11/7/2024  3:30 AM  MELD-Na: 35 at 11/7/2024   3:30 AM  Calculated from:  Serum Creatinine: 9.2 mg/dL (Using max of 3 mg/dL) at 11/7/2024  3:30 AM  Serum Sodium: 136 mmol/L at 11/7/2024  3:30 AM  Total Bilirubin: 6.3 mg/dL at 11/7/2024  3:30 AM  Serum Albumin: 3.2 g/dL at 11/7/2024  3:30 AM  INR(ratio): 2.2 at 11/7/2024  3:30 AM  Age at listing (hypothetical): 59 years  Sex: Female at 11/7/2024  3:30 AM    Decompensated Liver Cirrhosis  - Etiology: Alcohol +/- autoimmune (+ ASMA/JORDAN 1:40)? +/- Hep C (has + Ab)  - History of Decompensation: yes, ascites  - MELD 3.0 35   - Complicated by hepatorenal syndrome and   - Pending PETH/HepC/IgG and doppler U/S   - Transplant Candidacy -  may consider transplant eval once encephalopathy improves  right now unable to assess support system or commitment to sobriety      Ascites and LE edema  - history of ascites:  yes   - history of Spontaneous Bacterial Peritonitis: no (recent tap with 125 WBC 10% segs)  - S/P paracentesis with 5 L removed on 10/30  - Repeat U/S pending but is largely volume overloaded at this time      Encephalopathy  - may have HE, would be grade 3 but possible other etiologies should be ruled out   - currently on lactulose, need to increase  - would also recommend head imaging   - although no overt signs of infection would consider infectious workup and empiric abx given cirrhosis (may not amount typical immune response)   - may also have worsening encephalopathy in setting of severe renal dysfunction, uremia?      Hepatorenal syndrome   Worsening renal function despite albumin, midodrine, octreotide and attempts at diuretics. Renal function decline unfortunately too advanced for terlipressin.   - Nephrology has been consulted for possible HD   - Still on octreotide, albumin and midodrine         RECOMMENDATIONS:  --Follow up PETH, IgG, Hep C RNA   --Follow up liver U/S  --Continue treatment for HRS, likely would not benefit from terlipressin at this point (contraindicated in Creatinine > 5.0), follow  up nephrology recs  --Continue lactulose and titrate to 3-4 BM daily --> increase frequency to TID until she starts having BM  --Will consider transplant eval once mentation improves   --Obtain head CT   --Would start empiric abx with worsening encephalopathy and order blood cultures (if negative and patient mentation improves could stop within 48 hours)   --Agree with palliative care consult        Thank you for your consult. I will follow-up with patient. Please contact us if you have any additional questions.    Jenna Muñiz MD  Hepatology  [facility] - Telemetry Stepdown

## 2024-11-07 NOTE — ASSESSMENT & PLAN NOTE
Most likely due to hepatorenal syndrome.  Most recent creatinine and eGFR are listed below.  Recent Labs     11/05/24  0719 11/06/24  0752 11/07/24  0330   CREATININE 6.26* 7.19* 9.2*   EGFRNORACEVR 7* 6* 4.6*     - Per Nephro following pt at OSH, avoid using diuretics to manage the ascites (aldactone being held)  - See HRS for remainder of plan

## 2024-11-07 NOTE — HPI
59F w/ hx of alcoholic cirrhosis status post recent paracentesis 10/30/2024 (5 L removed) who was being seen at [facility]  as transfer from [facility] where she presented with bilateral lower extremity edema with lower extremity ultrasound negative for DVT.  Patient transferred secondary to acute kidney injury and bilateral pleural effusions.  Prior to transfer, patient denying fevers, dyspnea, nausea, vomiting or increasing abdominal girth status post paracentesis. Patient admitted to [facility] with probable hepatorenal syndrome w/ gradually worsening ascites s/p para a few days prior.  Nephrology and Gastroenterology were consulted.  Pt claims her last alcohol intake was about 2-1/2 months ago.  She was started on hepatorenal protocol.  She was also started on Lasix and Aldactone. Bilateral renal ultrasound studies showed no evidence of hydronephrosis. During stay at OSH, hemoglobin dropped and renal function continued to worsen despite treatment. After discussion with Gastroenterology and Nephrology, decision made to transfer patient to a tertiary care center for hepatology evaluation. On arrival to [facility], patient alert and oriented and HDS. Pt admitted to hospital medicine w/ plans for Hepatology and Nephrology consultation for further workup and management of hepatorenal syndrome and liver failure

## 2024-11-07 NOTE — ASSESSMENT & PLAN NOTE
Palliative consulted at OSH   - Consult placed upon arrival to [facility]

## 2024-11-07 NOTE — ASSESSMENT & PLAN NOTE
"Impression:  Rudolph Ojeda is a 59 y.o. female with PMHx of tobacco use, cirrhosis secondary to EtOH abuse s/p paracentesis 10/30 (5 L off), and DONNA. Patient initially presented to [facility] for bilateral lower extremity edema with lower extremity ultrasound negative for DVT. Patient was then transferred to [facility] for higher level of care. While at OSH, patient had worsening renal function and drop in hemoglobin. Patient transferred [place] [facility] [place]ology eval.    Patient currently resting in bed, appears encephalopathic but not in acute distress. Family is at bedside. Family includes 2 of patient's sisters (including Francine Ojeda), patient's oldest daughter, and patient's niece and nephew. Family is agreeable to Palliative Medicine.    Palliative Medicine was consulted by primary, Dr. Kerr, for goals of care and advanced care planning.    Advance Care Planning     Date: 11/08/2024    [facility]  I engaged the family in a voluntary conversation about advance care planning and we specifically addressed what the goals of care would be moving forward, in light of the patient's change in clinical status, specifically patient's current hospitalization, cirrhosis, encephalopathy, and worsening kidney functions.  We did specifically address the patient's likely prognosis, which appear to be poor. We explored the patient's values and preferences for future care. We discussed:  - Role of Palliative Medicine  - Concerns: Family was concerned about not having any updates from the hepatology team. The hepatology team rounded on the patient prior to the family coming to visit. Hepatology, primary, and Pal Med have had difficulties reaching the patient's family via telephone. Patient's sister, Francine Ojeda, informed me that she put her phone on "do not disturb". Francine Ojeda was encouraged to have phone readily accessible as she is identified as the point of contact.  - Family dynamics: Patient lives with " sister, Francine Ojeda. Patient has 6 adult children, and not . Patient does not have HCPOA identified and in unable to participate in conversation to complete ACP documents. All of patient's children and extended family live in [place] or [place].   - Past experiences with chronic illnesses: family states this is their first time dealing with a family member with a chronic illness and have not previously had goals of care discussions with the patient.    The family endorses that what is most important right now is to focus on quality of life, even if it means sacrificing a little time, extending life as long as possible, even it it means sacrificing quality, curative/life-prolongation (regardless of treatment burdens), and initiating discussions with the hepatology team regarding liver transplant evaluation status    Accordingly, we have decided that the best plan to meet the patient's goals includes continuing with treatment and allow more time for family to with hepatology team. I called Francine Ojeda at [phone] and left a voice mail to inform her that the hepatology team plans to round on patient on 11/9/24 between [id number] and asked that she and the family be present during those times.                     Life Limiting Diagnosis  Hepatorenal Syndrome  - managed per primary team  - hepatorenal protocol initiated    Acute Kidney Injury  - nephrology consulted    Alcoholic Cirrhosis  - PETH pending  - hepatology consulted      Symptom Management  Pain  - no pain behaviors at this time    Ascites  - Last Paracentesis: 10/30, 5L removed  - hepatology consulted    Dyspnea  - 24h SpO2: 90-99%  - Maintained on RA    Constipation  - LBM: 11/7  - Lactulose daily      Recommendations  - Continue management per primary team  - Patient and family would benefit from continued education and information regarding plan of care, prognosis, and what to expect in the future        Thank you for consulting  Palliative Medicine.

## 2024-11-08 PROBLEM — K74.60 DECOMPENSATED HEPATIC CIRRHOSIS: Status: ACTIVE | Noted: 2024-01-01

## 2024-11-08 PROBLEM — G93.40 ACUTE ENCEPHALOPATHY: Status: ACTIVE | Noted: 2024-01-01

## 2024-11-08 PROBLEM — T80.92XA BLOOD TRANSFUSION REACTION: Status: ACTIVE | Noted: 2024-01-01

## 2024-11-08 PROBLEM — D68.9 COAGULOPATHY: Status: ACTIVE | Noted: 2024-01-01

## 2024-11-08 PROBLEM — R33.8 ACUTE URINARY RETENTION: Status: RESOLVED | Noted: 2024-01-01 | Resolved: 2024-01-01

## 2024-11-08 PROBLEM — R34 ANURIA: Status: ACTIVE | Noted: 2024-01-01

## 2024-11-08 PROBLEM — J96.01 ACUTE HYPOXIC RESPIRATORY FAILURE: Status: ACTIVE | Noted: 2024-01-01

## 2024-11-08 PROBLEM — J98.8 AIRWAY COMPROMISE: Status: ACTIVE | Noted: 2024-01-01

## 2024-11-08 PROBLEM — K72.90 DECOMPENSATED HEPATIC CIRRHOSIS: Status: ACTIVE | Noted: 2024-01-01

## 2024-11-08 PROBLEM — R10.9 ABDOMINAL PAIN: Status: ACTIVE | Noted: 2024-01-01

## 2024-11-08 PROBLEM — J96.01 ACUTE HYPOXEMIC RESPIRATORY FAILURE: Status: ACTIVE | Noted: 2024-01-01

## 2024-11-08 NOTE — ASSESSMENT & PLAN NOTE
Most likely due to hepatorenal syndrome.  Most recent creatinine and eGFR are listed below.  Recent Labs     11/07/24  0330 11/07/24  1342 11/08/24  0849   CREATININE 9.2* 8.7* 9.6*   EGFRNORACEVR 4.6* 4.9* 4.3*       - Per Nephro following pt at OSH, avoid using diuretics to manage the ascites (aldactone being held)  - Nephrology following, appreciate recs   - See HRS for remainder of plan

## 2024-11-08 NOTE — CONSULTS
Single lumen 18G, 10CM midline placed in the right basilic vein. Needle advanced into the vessel under real time ultrasound guidance. Image recorded and saved.    Max dwell date 12/06/24.  Lot number: [id number]

## 2024-11-08 NOTE — ASSESSMENT & PLAN NOTE
Palliative consulted at OSH   - Consult placed upon arrival to [facility]

## 2024-11-08 NOTE — CONSULTS
[facility] - Telemetry Stepdown  Palliative Medicine  Consult Note    Patient Name: Rudolph Ojeda  MRN: [id number]  Admission Date: 11/6/2024  Hospital Length of Stay: 2 days  Code Status: Full Code   Attending Provider: Joana Kerr MD  Consulting Provider: Rosa De Leon NP  Primary Care Physician: Blaire, Primary Doctor  Principal Problem:Hepatorenal syndrome    Patient information was obtained from relative(s), past medical records, and primary team.      Consults  Assessment/Plan:     Palliative Care  Palliative care encounter  Impression:  Rudolph Ojeda is a 59 y.o. female with PMHx of tobacco use, cirrhosis secondary to EtOH abuse s/p paracentesis 10/30 (5 L off), and DONNA. Patient initially presented to [facility] for bilateral lower extremity edema with lower extremity ultrasound negative for DVT. Patient was then transferred to [facility] for higher level of care. While at OSH, patient had worsening renal function and drop in hemoglobin. Patient transferred to [facility] for hepatology eval.    Patient currently resting in bed, appears encephalopathic but not in acute distress. Family is at bedside. Family includes 2 of patient's sisters (including Francine Ojeda), patient's oldest daughter, and patient's niece and nephew. Family is agreeable to Palliative Medicine.    Palliative Medicine was consulted by primary, Dr. Kerr, for goals of care and advanced care planning.    Advance Care Planning    Date: 11/08/2024    [facility]  I engaged the family in a voluntary conversation about advance care planning and we specifically addressed what the goals of care would be moving forward, in light of the patient's change in clinical status, specifically patient's current hospitalization, cirrhosis, encephalopathy, and worsening kidney functions.  We did specifically address the patient's likely prognosis, which appear to be poor. We explored the patient's values and preferences for future care. We discussed:  - Role of  "Palliative Medicine  - Concerns: Family was concerned about not having any updates from the hepatology team. The hepatology team rounded on the patient prior to the family coming to visit. Hepatology, primary, and Pal Med have had difficulties reaching the patient's family via telephone. Patient's sister, Francine Ojeda, informed me that she put her phone on "do not disturb". Francine Ojeda was encouraged to have phone readily accessible as she is identified as the point of contact.  - Family dynamics: Patient lives with sister, Francine Ojeda. Patient has 6 adult children, and not . Patient does not have HCPOA identified and in unable to participate in conversation to complete ACP documents. All of patient's children and extended family live in [place] or [place].   - Past experiences with chronic illnesses: family states this is their first time dealing with a family member with a chronic illness and have not previously had goals of care discussions with the patient.    The family endorses that what is most important right now is to focus on quality of life, even if it means sacrificing a little time, extending life as long as possible, even it it means sacrificing quality, curative/life-prolongation (regardless of treatment burdens), and initiating discussions with the hepatology team regarding liver transplant evaluation status    Accordingly, we have decided that the best plan to meet the patient's goals includes continuing with treatment and allow more time for family to with hepatology team. I called Francine Ojeda at [phone] and left a voice mail to inform her that the hepatology team plans to round on patient on 11/9/24 between [id number] and asked that she and the family be present during those times.                     Life Limiting Diagnosis  Hepatorenal Syndrome  - managed per primary team  - hepatorenal protocol initiated    Acute Kidney Injury  - nephrology consulted    Alcoholic " "Cirrhosis  - PETH pending  - hepatology consulted      Symptom Management  Pain  - no pain behaviors at this time    Ascites  - Last Paracentesis: 10/30, 5L removed  - hepatology consulted    Dyspnea  - 24h SpO2: 90-99%  - Maintained on RA    Constipation  - LBM: 11/7  - Lactulose daily      Recommendations  - Continue management per primary team  - Patient and family would benefit from continued education and information regarding plan of care, prognosis, and what to expect in the future        Thank you for consulting Palliative Medicine.          Thank you for your consult. I will follow-up with patient. Please contact us if you have any additional questions.    Subjective:     HPI:   As per H&P, "Rudolph Ojeda is a 59 y.o. female with a PMHx of alcoholic cirrhosis status post recent paracentesis 10/30/2024 (5 L removed) who was being seen at [facility] as transfer from [facility] where she presented with bilateral lower extremity edema with lower extremity ultrasound negative for DVT. Patient transferred secondary to acute kidney injury and bilateral pleural effusions. Prior to transfer, patient denying fevers, dyspnea, nausea, vomiting or increasing abdominal girth status post paracentesis. Patient admitted to [facility] with probable hepatorenal syndrome w/ gradually worsening ascites s/p para a few days prior. Nephrology and Gastroenterology were consulted. Pt claims her last alcohol intake was about 2-1/2 months ago. She was started on hepatorenal protocol. She was also started on Lasix and Aldactone. Bilateral renal ultrasound studies showed no evidence of hydronephrosis. During stay at OSH, hemoglobin dropped and renal function continued to worsen despite treatment. After discussion with Gastroenterology and Nephrology, decision made to transfer patient to a tertiary care center for hepatology evaluation. On arrival to [facility], patient alert and oriented and HDS. Pt admitted to hospital " "medicine w/ plans for Hepatology and Nephrology consultation for further workup and management of hepatorenal syndrome and liver failure."    Palliative Medicine was consulted by primary, Dr. Kerr, for goals of care and advanced care planning discussions.    Hospital Course:  No notes on file    Interval History: Palliative Medicine introduced to patient and family. Goals of care discussions initiated.    Past Medical History:   Diagnosis Date    DONNA (acute kidney injury)     Alcoholic cirrhosis of liver     Thrombocytopenia        Past Surgical History:   Procedure Laterality Date    APPENDECTOMY         Review of patient's allergies indicates:  No Known Allergies    Medications:  Continuous Infusions:  Scheduled Meds:   albumin human 25%  25 g Intravenous BID    lactulose  200 g Rectal BID    meropenem IV (PEDS and ADULTS)  1 g Intravenous Q8H    midodrine  10 mg Oral Q8H    morphine  2 mg Intravenous Once    octreotide  100 mcg Subcutaneous Q8H    rifAXImin  550 mg Oral BID    vancomycin (VANCOCIN) IV (PEDS and ADULTS)  750 mg Intravenous Once     PRN Meds:  Current Facility-Administered Medications:     0.9%  NaCl infusion (for blood administration), , Intravenous, Q24H PRN    dextrose 10%, 12.5 g, Intravenous, PRN    dextrose 10%, 25 g, Intravenous, PRN    glucagon (human recombinant), 1 mg, Intramuscular, PRN    glucose, 16 g, Oral, PRN    glucose, 24 g, Oral, PRN    naloxone, 0.02 mg, Intravenous, PRN    ondansetron, 8 mg, Intravenous, Q6H PRN    sodium chloride 0.9%, 10 mL, Intravenous, Q12H PRN    Flushing PICC/Midline Protocol, , , Until Discontinued **AND** sodium chloride 0.9%, 10 mL, Intravenous, Q12H PRN    Pharmacy to dose Vancomycin consult, , , Once **AND** vancomycin - pharmacy to dose, , Intravenous, pharmacy to manage frequency    Family History    None       Tobacco Use    Smoking status: Every Day     Current packs/day: 0.25     Average packs/day: 0.3 packs/day for 40.0 years (10.0 ttl pk-yrs) "     Types: Cigarettes    Smokeless tobacco: Not on file   Substance and Sexual Activity    Alcohol use: Not Currently     Comment: claims quit 9/2024    Drug use: Not on file    Sexual activity: Not on file       Review of Systems   Unable to perform ROS: Acuity of condition     Objective:     Vital Signs (Most Recent):  Temp: 98 °F (36.7 °C) (11/08/24 1548)  Pulse: 89 (11/08/24 1548)  Resp: 18 (11/08/24 1548)  BP: (!) 151/76 (11/08/24 1548)  SpO2: (!) 88 % (11/08/24 1548) Vital Signs (24h Range):  Temp:  [96 °F (35.6 °C)-98 °F (36.7 °C)] 98 °F (36.7 °C)  Pulse:  [] 89  Resp:  [16-18] 18  SpO2:  [88 %-99 %] 88 %  BP: (114-151)/(59-76) 151/76     Weight: 76.7 kg (169 lb 1.5 oz)  Body mass index is 29.95 kg/m².       Physical Exam  Vitals and nursing note reviewed.   Constitutional:       General: She is sleeping. She is not in acute distress.     Appearance: She is obese. She is ill-appearing and toxic-appearing.   HENT:      Head: Normocephalic and atraumatic.      Mouth/Throat:      Mouth: Mucous membranes are dry.   Eyes:      Extraocular Movements: Extraocular movements intact.   Cardiovascular:      Rate and Rhythm: Normal rate.   Pulmonary:      Effort: Pulmonary effort is normal.   Abdominal:      General: There is distension.   Skin:     General: Skin is warm and dry.   Neurological:      Mental Status: She is easily aroused.      Motor: Weakness present.            Review of Symptoms      Symptom Assessment (ESAS 0-10 Scale)  Unable to complete assessment due to Acuity of condition         Pain Assessment in Advanced Demential Scale (PAINAD)   Breathing - Independent of vocalization:  0  Negative vocalization:  0  Facial expression:  0  Body language:  0  Consolability:  0  Total:  0    Performance Status:  40    Living Arrangements:  Lives with family    Psychosocial/Cultural:   See Palliative Psychosocial Note: Yes  - not   - did not work  - has 6 adult children, 11 grandchildren, 1  "great-grand child  - the youngest sibling of 6 brothers and sisters  - commonly known to family as the "life of the party"  - enjoyed spending time with family, friends, laughing, and music  - loves to watch "True Crime" shows  - participated in annual "sister trips" with her sisters. Patient last traveled to [place] with her sisters on the last sister trip    **Primary  to Follow**  Palliative Care  Consult: Yes    Spiritual:  F - Antoinette and Belief:  Tenriism  A - Address in Care:  [place] support offered.        Advance Care Planning  Advance Directives:   Living Will: No    LaPOST: No    Do Not Resuscitate Status: No    Medical Power of : No      Decision Making:  Patient unable to communicate due to disease severity/cognitive impairment and Family answered questions  Goals of Care: The family endorses that what is most important right now is to focus on coordinating discussion between family and hepatology team to gather more information regarding prognosis and plans for liver transplant evaluation process    Accordingly, we have decided that the best plan to meet the patient's goals includes continuing with treatment and following up with hepatology team regarding patient's transplant evaluation status         Significant Labs: All pertinent labs within the past 24 hours have been reviewed.  CBC:   Recent Labs   Lab 11/08/24  0849   WBC 11.68   HGB 6.8*   HCT 20.7*   *   PLT 34*     BMP:  Recent Labs   Lab 11/08/24  0849   GLU 91      K 3.3*   CL 98   CO2 18*   BUN 59*   CREATININE 9.6*   CALCIUM 9.7     LFT:  Lab Results   Component Value Date    AST 88 (H) 11/08/2024    ALKPHOS 50 11/08/2024    BILITOT 7.4 (H) 11/08/2024     Albumin:   Albumin   Date Value Ref Range Status   11/08/2024 3.6 3.5 - 5.2 g/dL Final     Protein:   Total Protein   Date Value Ref Range Status   11/08/2024 5.4 (L) 6.0 - 8.4 g/dL Final     Lactic acid:   No results found for: " ""LACTATE"    Significant Imaging: I have reviewed all pertinent imaging results/findings within the past 24 hours.      US Liver with Doppler 11/7/24  Impression:     Cirrhotic liver morphology.  No focal hepatic lesions.     Sequela of portal hypertension including reversal of flow about the main and right portal vein branches, bidirectional flow in the left portal vein branch, reversal of flow in the splenic vein, bidirectional flow of flow in the superior mesenteric vein, small volume ascites, and patent umbilical vein.     Bilateral pleural effusions.     Gallbladder sludge without evidence for cholecystitis.     US Retroperitoneal 11/2/24  Impression:     1. The kidneys demonstrate no hydronephrosis.  2. Ascites.            In my care of this patient with acute on chronic severe illness with threat to life and/or bodily function, I am recommending goal-concordant care as noted above. I spent a significant amount of time reviewing external records/ recommendations of other providers, reviewing recent test results, and discussed care with other subspecialists involved    The above recommendations communicated directly to primary team on 11/8/24    Additional 60min time spent on a voluntary advance care planning and /or goals of care discussion, providing emotional support, formulating, and communicating prognosis and exploring burden/benefit of various approaches of treatment.         Rosa De Leon, BALBINA  Palliative Medicine  [facility] - Telemetry Stepdown              "

## 2024-11-08 NOTE — NURSING
RN assessed pt to find that pt's breath sounds were coarse and oxygenation requirements increased while pt receiving blood. Notified MD Cirilo and med team 3 of assessment findings.     1556  MD Yoan arrive to pt's bedside and assessed pt. MD Yoan ordered ABG and chest X ray. MD Yoan remains at bedside at this time.

## 2024-11-08 NOTE — PROGRESS NOTES
[facility] - Telemetry [facility] Medicine  Progress Note    Patient Name: Rudolph Ojeda  MRN: [id number]  Patient Class: IP- Inpatient   Admission Date: 11/6/2024  Length of Stay: 2 days  Attending Physician: Joana Kerr MD  Primary Care Provider: Blaire, Primary Doctor        Subjective:     Principal Problem:Hepatorenal syndrome        HPI:  59F w/ hx of alcoholic cirrhosis status post recent paracentesis 10/30/2024 (5 L removed) who was being seen at [facility]  as transfer from [facility] where she presented with bilateral lower extremity edema with lower extremity ultrasound negative for DVT.  Patient transferred secondary to acute kidney injury and bilateral pleural effusions.  Prior to transfer, patient denying fevers, dyspnea, nausea, vomiting or increasing abdominal girth status post paracentesis. Patient admitted to [facility] with probable hepatorenal syndrome w/ gradually worsening ascites s/p para a few days prior.  Nephrology and Gastroenterology were consulted.  Pt claims her last alcohol intake was about 2-1/2 months ago.  She was started on hepatorenal protocol.  She was also started on Lasix and Aldactone. Bilateral renal ultrasound studies showed no evidence of hydronephrosis. During stay at OSH, hemoglobin dropped and renal function continued to worsen despite treatment. After discussion with Gastroenterology and Nephrology, decision made to transfer patient to a tertiary care center for hepatology evaluation. On arrival to [facility], patient alert and oriented and HDS. Pt admitted to hospital medicine w/ plans for Hepatology and Nephrology consultation for further workup and management of hepatorenal syndrome and liver failure    Overview/Hospital Course:  Patient is a 51-year-old female with history of alcohol cirrhosis s/p paracentesis on 10/30/2024 (5 L removed) who was admitted at [facility] after presenting with bilateral lower extremity edema (ultrasound negative for DVT).   Patient with worsening renal function and concern for HRS, so she was transferred to [facility].  Nephrology consulted for possible need for dialysis, they state there is no knee at this time we will continue to follow.  Hepatology consulted and recommended infectious workup given the patient's altered mental status upon arrival.  SBP prophylaxis with ceftriaxone.  Patient's ammonia elevated to 129, which is the likely cause of her encephalopathy.  11/8 Patient with no improvement in her mental status despite bowel movements.  Lactulose change from oral to enema.  Bedside ultrasound showed a small amount of fluid the patient's abdomen, not conducive to bedside diagnostic paracentesis.  Hepatology recommended transfer to  liver team, so patient will be transferred to their service tomorrow morning (11/9).  Patient with hemoglobin of 6.8, transfuse 1 unit pRBCs.  CT head ordered to further evaluate her mental status change, but unable to remain still for imaging.  Avoiding sedating medications given her mental status change.    Interval History: NAEON. AF, VSS.  Patient without any improvement in her mentation, and she is unable to answer questions or follow commands.  She does continuously groan, and her pain/groaning seems to worsen with abdominal palpation.     Patient went decreased hemoglobin to 6.8, transfused 1 unit pRBC.     Review of Systems   Unable to perform ROS: Mental status change     Objective:     Vital Signs (Most Recent):  Temp: 96 °F (35.6 °C) (11/08/24 1510)  Pulse: 83 (11/08/24 1510)  Resp: 18 (11/08/24 1510)  BP: 138/63 (11/08/24 1510)  SpO2: 95 % (11/08/24 1510) Vital Signs (24h Range):  Temp:  [96 °F (35.6 °C)-97.1 °F (36.2 °C)] 96 °F (35.6 °C)  Pulse:  [] 83  Resp:  [16-18] 18  SpO2:  [90 %-99 %] 95 %  BP: (114-141)/(59-76) 138/63     Weight: 76.7 kg (169 lb 1.5 oz)  Body mass index is 29.95 kg/m².    Intake/Output Summary (Last 24 hours) at 11/8/2024 1514  Last data filed at 11/8/2024  1345  Gross per 24 hour   Intake 120 ml   Output 11 ml   Net 109 ml         Physical Exam  Vitals reviewed.   Constitutional:       General: She is in acute distress.      Appearance: She is ill-appearing and toxic-appearing.      Comments: Lethargic    HENT:      Head: Normocephalic and atraumatic.   Eyes:      General: Scleral icterus present.   Cardiovascular:      Rate and Rhythm: Normal rate and regular rhythm.      Heart sounds: Normal heart sounds.   Pulmonary:      Effort: Pulmonary effort is normal. No respiratory distress.      Breath sounds: No wheezing.      Comments: Difficult to auscultate lung sounds as patient making noise and not following commands.   Abdominal:      General: There is distension.      Tenderness: There is abdominal tenderness. There is no guarding.      Comments: Mildly rigid, distended abdomen.   Musculoskeletal:         General: Swelling present.      Right lower leg: Edema present.      Left lower leg: Edema present.      Comments: Diffuse edema    Skin:     General: Skin is warm and dry.      Capillary Refill: Capillary refill takes less than 2 seconds.   Neurological:      Mental Status: She is disoriented.      Comments: Patient lethargic, unable to follow commands or answer questions. Patient did open her eyes to verbal and tactile stimulus.              Significant Labs: All pertinent labs within the past 24 hours have been reviewed.  CBC:   Recent Labs   Lab 11/07/24  0330 11/08/24  0849   WBC 11.37 11.68   HGB 7.6* 6.8*   HCT 23.5* 20.7*   PLT 41* 34*     CMP:   Recent Labs   Lab 11/07/24  0330 11/07/24  1342 11/08/24  0849    137 139   K 3.6 3.4* 3.3*   CL 98 98 98   CO2 24 21* 18*    100 91   BUN 57* 58* 59*   CREATININE 9.2* 8.7* 9.6*   CALCIUM 8.8 9.1 9.7   PROT 4.8*  --  5.4*   ALBUMIN 3.2* 3.4* 3.6   BILITOT 6.3*  --  7.4*   ALKPHOS 51  --  50   AST 58*  --  88*   ALT 22  --  27   ANIONGAP 14 18* 23*     Coagulation:   Recent Labs   Lab 11/08/24  0849    INR 2.4*     TSH:   Recent Labs   Lab 11/08/24  1157   TSH 0.312*       Significant Imaging: I have reviewed all pertinent imaging results/findings within the past 24 hours.    Assessment/Plan:      * Hepatorenal syndrome  59-y/o w/ alcoholic cirrhosis, ascites, HCV originally transferred [facility] on 11/01 with acute kidney injury following paracentesis (5 L) with c/f hepatorenal syndrome. Patient reports that her last alcohol intake was several months ago. On admission /52, HR 95, RR 14, SpO2 98%, T 97.6°. Labs (11/2) WBC 15.5, Hb 7.7, Plts 55, INR 2.5, Na 135, K 3.3, Cr 2.27, T bili 10.0, , ALT 57, ammonia 60. BL renal US showed no evidence of hydronephrosis or acute changes.     Patient started on hepatorenal protocol (octreotide, albumin), lactulose and midodrine (10 mg Q 8 h) with SBP mostly in the mid-teens. Renal function has continued to decline following admission with Cr increasing from 2.27 > 6.26 over the past 5 days with minimal UO (Tong). Hb fell from 7.7 > 6.5 improving to 8.1 following pRBC x1. LFTs have been improving (T bili 10.0 > 7.7,  > 67, ALT 57 > 27, ammonia 60 > <9.) MELD has incr (37 > 40). Transfer requested for hepatology for further w/u and eval of A/C liver failure on background of alcoholic cirrhosis, and plans for continued Nephrology input for mx of hepatorenal syndrome.    - Continue HRS treatment   - Emerging potential for HD needs for which Nephro was consulted upon [place] [facility]  - appreciate nephrology and hepatology recs     Abdominal pain  Patient with abdominal pain in setting of decompensated cirrhosis.   - Consider diagnostic paracentesis to evaluate for SBP   - Broadened from Ceftriaxone to meropenem given decline in patients condition despite ceftriaxone and lactulose   - Consider CT abdomen pelvis if patient mental status improves and she is able to lie still for imaging       Anuria  Patient  is anuric since admission. Tong placed on 11/7 with only 10 cc output. Tong removed, but patient without any urine output per nursing staff. Patient also with poor po intake due to her altered mental status.        Hepatic encephalopathy  Patient with ammonia elevated to 129 on 11/7, was previous <9 on 11/4. Patient with altered mental status: inability to follow commands or answer questions. Per family, patient was speaking at OSH and mental status has been worsening since the morning of 11/7.   - Lactulose enema 200 g BID, titrate to 3-4 BM per day   - Rifaximin started 11/8, but holding due to patient's worsening mental status and concern for ability to swallow pills       Palliative care encounter  See palliative care note       Anemia  Anemia is likely due to chronic disease due to Chronic liver disease. Most recent hemoglobin and hematocrit are listed below.  Recent Labs     11/06/24  0751 11/07/24  0330 11/08/24  0849   HGB 8.2* 7.6* 6.8*   HCT 24.8* 23.5* 20.7*       Plan  - Monitor serial CBC: Daily  - Transfuse PRBC if patient becomes hemodynamically unstable, symptomatic or H/H drops below 7/21.  - Patient has received 1 units of PRBCs on 11/4 , and another unit pRBC on 11/8   - Patient's anemia is currently stable  - Blood consent obtained upon arrival to [facility]    Malnutrition  RD consulted      Hepatitis C  Patient with cirrhosis believed 2/2 alcohol     11/7 HCV Quantitative: 19,473      DONNA (acute kidney injury)  Most likely due to hepatorenal syndrome.  Most recent creatinine and eGFR are listed below.  Recent Labs     11/07/24  0330 11/07/24  1342 11/08/24  0849   CREATININE 9.2* 8.7* 9.6*   EGFRNORACEVR 4.6* 4.9* 4.3*       - Per Nephro following pt at OSH, avoid using diuretics to manage the ascites (aldactone being held)  - Nephrology following, appreciate recs   - See HRS for remainder of plan    ACP (advance care planning)  Palliative consulted at OSH   - Consult placed upon arrival to  [facility]      Ascites due to alcoholic cirrhosis  See Hepatorenal Syndrome  - Hepatology consulted        VTE Risk Mitigation (From admission, onward)           Ordered     IP VTE HIGH RISK PATIENT  Once         11/06/24 2150     Place sequential compression device  Until discontinued         11/06/24 2150                    Discharge Planning   JOSAFAT: 11/14/2024     Code Status: Full Code   Is the patient medically ready for discharge?:     Reason for patient still in hospital (select all that apply): Patient trending condition, Treatment, and Consult recommendations  Discharge Plan A: Home with family          Artemio Milton MD  Department of Hospital Medicine   [facility] - Telemetry Stepdown

## 2024-11-08 NOTE — PLAN OF CARE
[facility] - Telemetry Stepdown  Initial Discharge Assessment       Primary Care Provider:   Cornelia Ward NP          Admission Diagnosis: Hepatorenal syndrome [K76.7]    Admission Date: 11/6/2024  Expected Discharge Date: 11/14/2024    Transition of Care Barriers: Underinsured, Substance Abuse    Payor: MEDICAID / Plan: [employer] / Product Type: Managed Medicaid /     Extended Emergency Contact Information  Primary Emergency Contact: Francine Ojeda  Mobile Phone: [phone]  Relation: Sister    Discharge Plan A: Home with family  Discharge Plan B: Home with family      [id number] [place] [address]  [address] [address]  Phone: [phone] Fax: [phone]    [facility] - [facility] [address]  [address] [address]  Phone: [phone] Fax: [phone]          CM met with patient in room for Discharge Planning Assessment.  Patient unable to answer questions due altered mental status. CM spoke with Francine Ojeda () [phone] via phone to complete discharge planning assessment.  Per Francine, patient lives with her in a mobile home with 5 step(s) to enter.   Per Francine, patient was independent with ADLS and used no DME for ambulation. Per Pasquale, patient is not on dialysis, and does not take Coumadin. Patient does have a new PCP set up , but was admitted to hospital before her first visit. Pharmacy has been verified. Patient does not have  home health, and has not been hospitalized in past 30 days.  Patient will have help from Francine Hanson) [phone]  upon discharge.   Discharge Planning discussed with Francine Cheng) [phone] via phone.  All questions addressed.  CM will follow for needs.    Discharge Plan A and Plan B have been determined by review of patient's clinical status, future medical and  therapeutic needs, and coverage/benefits for post-acute care in coordination with multidisciplinary team members.        Initial Assessment (most recent)       Adult Discharge Assessment - 11/08/24 0936          Discharge Assessment    Assessment Type Discharge Planning Assessment     Confirmed/corrected address, phone number and insurance Yes     Confirmed Demographics Correct on Facesheet     Source of Information family     When was your last doctors appointment? --   unknown    Does patient/caregiver understand observation status Yes     Communicated JOSAFAT with patient/caregiver Date not available/Unable to determine     Reason For Admission Hepatorenal syndrome     People in Home sibling(s)     Facility Arrived From: [facility]     Do you expect to return to your current living situation? Yes     Do you have help at home or someone to help you manage your care at home? Yes     Who are your caregiver(s) and their phone number(s)? Francine Ojeda  (Sister)  [phone]     Prior to hospitilization cognitive status: Not Oriented to Time;Not Oriented to Place     Current cognitive status: Unable to Assess   altered mental status    Walking or Climbing Stairs Difficulty no     Dressing/Bathing Difficulty no     Equipment Currently Used at Home none     Readmission within 30 days? No     Patient currently being followed by outpatient case management? No     Do you currently have service(s) that help you manage your care at home? No     Do you take prescription medications? Yes     Do you have prescription coverage? Yes     Coverage MEDICAID - [employer]     Do you have any problems affording any of your prescribed medications? TBD     Is the patient taking medications as prescribed? yes     Who is going to help you get home at discharge? Francine Ojeda (Sister) [phone]     How do you get to doctors appointments? car, drives self;family or friend will provide     Are you on dialysis?  No     Do you take coumadin? No     Discharge Plan A Home with family     Discharge Plan B Home with family     DME Needed Upon Discharge  other (see comments)   TBD    Discharge Plan discussed with: Sibling     Name(s) and Number(s) Francine Ojeda (Sister) [phone]     Transition of Care Barriers Underinsured;Substance Abuse        Physical Activity    On average, how many days per week do you engage in moderate to strenuous exercise (like a brisk walk)? 0 days     On average, how many minutes do you engage in exercise at this level? 0 min        Financial Resource Strain    How hard is it for you to pay for the very basics like food, housing, medical care, and heating? Somewhat hard        Housing Stability    In the last 12 months, was there a time when you were not able to pay the mortgage or rent on time? No     At any time in the past 12 months, were you homeless or living in a shelter (including now)? No        Transportation Needs    Has the lack of transportation kept you from medical appointments, meetings, work or from getting things needed for daily living? No        Food Insecurity    Within the past 12 months, you worried that your food would run out before you got the money to buy more. Sometimes true     Within the past 12 months, the food you bought just didn't last and you didn't have money to get more. Sometimes true        Stress    Do you feel stress - tense, restless, nervous, or anxious, or unable to sleep at night because your mind is troubled all the time - these days? Patient unable to answer        Social Isolation    How often do you feel lonely or isolated from those around you?  Patient unable to answer        Alcohol Use    Q1: How often do you have a drink containing alcohol? 4 or more times a week     Q2: How many drinks containing alcohol do you have on a typical day when you are drinking? 3 or 4     Q3: How often do you have six or more drinks on one occasion? Less than monthly         Utilities    In the past 12 months has the electric, gas, oil, or water company threatened to shut off services in your home? No        Health Literacy    How often do you need to have someone help you when you read instructions, pamphlets, or other written material from your doctor or pharmacy? Rarely        OTHER    Name(s) of People in Home Francine Ojeda (Sister) [phone]                   Laurence Reeves RN     [phone]

## 2024-11-08 NOTE — PROGRESS NOTES
"[facility] - Medical / Clinical  Nephrology  CONSULT Note      Patient Name: Rudolph Ojeda   MRN: [id number]   Current Provider: Joana Kerr MD  Primary Care Provider: Blaire Primary Doctor   Admission Date: 11/6/2024   Hospital Day: 2  Bed: 8093/8093 A  Principal Problem: Hepatorenal syndrome       SUBJECTIVE    Rudolph Ojeda is a 59 y.o. female ,is admitted on 11/6/2024  with past medical history of   tobacco use, cirrhosis secondary to EtOH abuse status post recent paracentesis 10/30/2024 per radiology with 5 L.    She was initially presented to Outside facility ([facility]) for BL lower limb edema and ascites. She had paracentesis there, and was started on HRS protocol including octreotide, albumin, lactulose and midodrine (10 mg Q 8 h). Her BP was initially ranged between 110-119. Her renal functions was found to have gotten worse  despite the treatment. Cr raised from 2.27 to 9.2 in 7 days. Her HB was low and she received one unit of PRBC.     Nephrology was consulted for the possible need of dialysis.      INTERVAL HISTORY:  Nurse informed that she is worse then yesterday. Had  a bed night. Complaining of the abdominal pain. Pain medications given but not helping. Moving in bed due to abdominal pain. Not communicating. See with the whole family at the bed side. Informed them about the poor prognosis.   Hepatology is involved and they are evaluating her for candidacy of liver transplant. She will move to the hepatology care tomorrow.    OBJECTIVE     Vitals:  BP (!) 137/59 (BP Location: Left arm, Patient Position: Lying)   Pulse 84   Temp 96.1 °F (35.6 °C) (Axillary)   Resp 16   Ht 5' 3" (1.6 m)   Wt 76.7 kg (169 lb 1.5 oz)   SpO2 (!) 90%   BMI 29.95 kg/m²    I/O last 3 completed shifts:  In: 470 [P.O.:470]  Out: 210 [Urine:210]   Net IO Since Admission: 260 mL [11/08/24 0918]    Physical Examination:  Constitutional: Chronically ill appearing. Complaining of abdominal pain.  HEENT: " Atraumatic. Icteric  Chest:  Chest is clear BL. No Crackles. No wheezes  Cardiovascular:  S1+S2+0.   Abdominal: [place], distended abdomin with generalized tenderness  Extremities: Bilateral lower extremity edema 1+  No following commands.    LABS:  US: no hydronephrosis  CXR 11/1: [place] son the L lower zone - no previous CXR for comparison  Hep C AB - High  JORDAN titers 1:80  Ascitic tap -      ASSESSMENT AND PLAN:    Rudolph Ojeda is a 59 y.o. female ,is admitted on 11/6/2024  with past medical history of   tobacco use, cirrhosis secondary to EtOH abuse status post recent paracentesis 10/30/2024 per radiology with 5 L .  Transfer from [facility] for hepatology eval. She is admitted with HRS - renals getting worse despite treatment    Nephrology  is consulted for the potential need for dialysis.      Impression:  DONNA stage 3 with PH of cirrhosis, had low BP  Baseline Creatinine: 1.64 on 9/11/24  Creatinine at time of consult: 9.2 (raised from 2.27 in 7 days)  Imaging- US kidney: no evidence of hydronephrosis   MAP's in the 80's on midodrine.   On HRS protocol   Renal US stable.   Etiology of DONNA: HRS vs ATN    Azotemia 57 (BL 20-30)    Hypotension     Anemia - Hb 7.6    Recommendations :   - Terlipressin not likely to help with her very low GFR.   - Continue with the HRS treatment  - Repeat BMPs twice daily until the renal functions start improving  - No indications for the dialysis at this point - we will evaluate her daily for it.  - If potential candidate for liver transplant then will have low threshold to start her on dialysis.    Monitor serum chemistries daily, strict intake and output Qshift , daily weights if able.  Renal protective measures: Please adjust medications for reduced clearance  Avoid nephrotoxic medications (NSAID, IV contrast)  Avoid ACEi and ARBs in the setting of DONNA  Maintain MAP > 65     Transfuse for Hb <7    Thank you for allowing us to participate in the care of  this patient.  Plan discussed with attending. Please call with any questions or concerns.       Will Joyce MD.  Clinical Nephrology Fellow  [facility] [address]

## 2024-11-08 NOTE — NURSING
Pt began to desat to 52% via 3L NC. Placed pt on ambu bag and pt's O2 sats increased to 96%. MD Yoan at bedside and aware of increased oxygenation needs. Ordered to stop pt blood transfusion at this time.     1715  Rapid response team called to pt bedside. Rapid response team and Dr. Choi arrived to pt's bedside. Orders received to transport pt to MICU. Pt transferred to room 7095 by rapid response team.

## 2024-11-08 NOTE — ASSESSMENT & PLAN NOTE
Likely chronic and secondary to liver disease    - Daily CBC  - Transfuse per critical care guidelines to maintain Hgb >7  - Has received 1 unit of PRBCs at OSH and 1 unit this hospitalization

## 2024-11-08 NOTE — NURSING
Patient being transfusing blood when nurse notice change breathing noted coarse breath sounds,  status Med team 3 made aware. Pt continue to moan in discomfort. Increase Oxygen to 3l because pt sat <89. Dr. Milton at bedside to assess pt. Pt continue be restless. 1700 Rapid nurse aware. New order written by the attending physician. Critical MD@bedside assessing patient. 17:15 Rapid nurse in room and pt transfer to ICU via bed.

## 2024-11-08 NOTE — ASSESSMENT & PLAN NOTE
Abdominal pain in the setting of decompensated cirrhosis    - Consider diagnostic paracentesis to evaluate for SBP  - Antibiotics escalated to meropenem  - Consider CT Abdomen Pelvis pending patient's hemodynamic instability

## 2024-11-08 NOTE — TREATMENT PLAN
HEPATOLOGY TREATMENT PLAN NOTE    Patient continued to be encephalopathic this morning unable to participate in exam despite 6 BM with lactulose. Cannot undergo transplant evaluation until patient is able to talk to us or until we can get ahold of family. Attempted to call sister with no answer.     ASSESSMENT/RECOMMENDATIONS:     MELD 3.0: 37 at 11/8/2024  8:49 AM  MELD-Na: 37 at 11/8/2024  8:49 AM  Calculated from:  Serum Creatinine: 9.6 mg/dL (Using max of 3 mg/dL) at 11/8/2024  8:49 AM  Serum Sodium: 139 mmol/L (Using max of 137 mmol/L) at 11/8/2024  8:49 AM  Total Bilirubin: 7.4 mg/dL at 11/8/2024  8:49 AM  Serum Albumin: 3.6 g/dL (Using max of 3.5 g/dL) at 11/8/2024  8:49 AM  INR(ratio): 2.4 at 11/8/2024  8:49 AM  Age at listing (hypothetical): 59 years  Sex: Female at 11/8/2024  8:49 AM        Decompensated Liver Cirrhosis  - Etiology: Alcohol +/- autoimmune (+ ASMA/JORDAN 1:40)? +/- Hep C (has + Ab)  - History of Decompensation: yes, ascites and encephalopathy   - MELD 3.0 37 --? Worsening largely driven by creatinine   - Complicated by hepatorenal syndrome vs ATN and encephalopathy   - Pending PETH/HepC/IgG  - Transplant Candidacy -  may consider transplant eval once encephalopathy improves  right now unable to assess support system or commitment to sobriety      Ascites and LE edema  - history of ascites:  yes   - history of Spontaneous Bacterial Peritonitis: no (recent tap with 125 WBC 10% segs)  - S/P paracentesis with 5 L removed on 10/30  - Small volume ascites on U/S but has bilateral pleural effusions      Encephalopathy  - may have HE, would be grade 3 but possible other etiologies should be ruled out   - currently on lactulose without improvement, would add rifaximin   - CTH pending   - Would recommend tap of either ascites or pleural effusions to ensure no infection as developed, currently on abx   - Follow up TSH/T4  - Consider uremia, however BUN not severely high      Hepatorenal syndrome vs  DONNA/ATN   Worsening renal function despite albumin, midodrine, octreotide and attempts at diuretics. Renal function decline unfortunately too advanced for terlipressin.   - Nephrology has been consulted for possible HD, following recs  - Still on octreotide, albumin and midodrine         RECOMMENDATIONS:  --Follow up PETH, IgG, Hep C RNA   --Continue treatment for HRS, not able to get  terlipressin at this point (contraindicated in Creatinine > 5.0), follow up nephrology recs  --Continue lactulose and titrate--> increase to QID   --Add rifaximin   --Follow up CTH   --Follow up blood culture/infectious workup   --Follow up TSH  --If still no improvement today would also order AM cortisol level   --Would monitor K and replace with low dose K supplement if needed although should be very cautious in setting of renal dysfunction  --Consider HD if her symptoms are primarily uremic   --Will consider transplant eval once mentation improves   --Continue empiric abx with worsening encephalopathy and order blood cultures (if negative and patient mentation improves could stop within 48 hours)   --Agree with palliative care consult     Signed,  Jenna Muñiz MD  [id number]  Gastroenterology Fellow  Pager: [phone]

## 2024-11-08 NOTE — SUBJECTIVE & OBJECTIVE
Interval History: NAEON. AF, VSS.  Patient without any improvement in her mentation, and she is unable to answer questions or follow commands.  She does continuously groan, and her pain/groaning seems to worsen with abdominal palpation.     Patient went decreased hemoglobin to 6.8, transfused 1 unit pRBC.     Review of Systems   Unable to perform ROS: Mental status change     Objective:     Vital Signs (Most Recent):  Temp: 96 °F (35.6 °C) (11/08/24 1510)  Pulse: 83 (11/08/24 1510)  Resp: 18 (11/08/24 1510)  BP: 138/63 (11/08/24 1510)  SpO2: 95 % (11/08/24 1510) Vital Signs (24h Range):  Temp:  [96 °F (35.6 °C)-97.1 °F (36.2 °C)] 96 °F (35.6 °C)  Pulse:  [] 83  Resp:  [16-18] 18  SpO2:  [90 %-99 %] 95 %  BP: (114-141)/(59-76) 138/63     Weight: 76.7 kg (169 lb 1.5 oz)  Body mass index is 29.95 kg/m².    Intake/Output Summary (Last 24 hours) at 11/8/2024 1514  Last data filed at 11/8/2024 1345  Gross per 24 hour   Intake 120 ml   Output 11 ml   Net 109 ml         Physical Exam  Vitals reviewed.   Constitutional:       General: She is in acute distress.      Appearance: She is ill-appearing and toxic-appearing.      Comments: Lethargic    HENT:      Head: Normocephalic and atraumatic.   Eyes:      General: Scleral icterus present.   Cardiovascular:      Rate and Rhythm: Normal rate and regular rhythm.      Heart sounds: Normal heart sounds.   Pulmonary:      Effort: Pulmonary effort is normal. No respiratory distress.      Breath sounds: No wheezing.      Comments: Difficult to auscultate lung sounds as patient making noise and not following commands.   Abdominal:      General: There is distension.      Tenderness: There is abdominal tenderness. There is no guarding.      Comments: Mildly rigid, distended abdomen.   Musculoskeletal:         General: Swelling present.      Right lower leg: Edema present.      Left lower leg: Edema present.      Comments: Diffuse edema    Skin:     General: Skin is warm and dry.       Capillary Refill: Capillary refill takes less than 2 seconds.   Neurological:      Mental Status: She is disoriented.      Comments: Patient lethargic, unable to follow commands or answer questions. Patient did open her eyes to verbal and tactile stimulus.              Significant Labs: All pertinent labs within the past 24 hours have been reviewed.  CBC:   Recent Labs   Lab 11/07/24  0330 11/08/24  0849   WBC 11.37 11.68   HGB 7.6* 6.8*   HCT 23.5* 20.7*   PLT 41* 34*     CMP:   Recent Labs   Lab 11/07/24  0330 11/07/24  1342 11/08/24  0849    137 139   K 3.6 3.4* 3.3*   CL 98 98 98   CO2 24 21* 18*    100 91   BUN 57* 58* 59*   CREATININE 9.2* 8.7* 9.6*   CALCIUM 8.8 9.1 9.7   PROT 4.8*  --  5.4*   ALBUMIN 3.2* 3.4* 3.6   BILITOT 6.3*  --  7.4*   ALKPHOS 51  --  50   AST 58*  --  88*   ALT 22  --  27   ANIONGAP 14 18* 23*     Coagulation:   Recent Labs   Lab 11/08/24  0849   INR 2.4*     TSH:   Recent Labs   Lab 11/08/24  1157   TSH 0.312*       Significant Imaging: I have reviewed all pertinent imaging results/findings within the past 24 hours.

## 2024-11-08 NOTE — ASSESSMENT & PLAN NOTE
Patient with Hypoxic Respiratory failure which is Acute.  she is not on home oxygen. Supplemental oxygen was provided and noted- Vent Mode: A/C  Oxygen Concentration (%):  [100] 100  Resp Rate Total:  [0 br/min-20 br/min] 20 br/min  Vt Set:  [315 mL-365 mL] 315 mL  PEEP/CPAP:  [5 cmH20] 5 cmH20  Mean Airway Pressure:  [0 oaL35-30 cmH20] 11 cmH20    Signs/symptoms of respiratory failure include- respiratory distress. Contributing diagnoses includes - Aspiration, Pleural effusion, and hepatorenal syndrome versus pulmonary edema.  Labs and images were reviewed. Patient Has recent ABG, which has been reviewed. Will treat underlying causes and adjust management of respiratory failure as follows- mechanical ventilation, antibiotics.    - ABG 7.478/28.5/55/21.1  - Continue meropenem and vancomycin  - Furosemide 120 mg IV once  - Glucose checks Q4H  - Pantoprazole 40 mg IV BID

## 2024-11-08 NOTE — ASSESSMENT & PLAN NOTE
Patient with ammonia elevated to 129 on 11/7, was previous <9 on 11/4. Patient with altered mental status: inability to follow commands or answer questions. Per family, patient was speaking at OSH and mental status has been worsening since the morning of 11/7.   - Lactulose enema 200 g BID, titrate to 3-4 BM per day   - Rifaximin started 11/8, but holding due to patient's worsening mental status and concern for ability to swallow pills

## 2024-11-08 NOTE — ASSESSMENT & PLAN NOTE
Ammonia elevated to 129 on 11/07, previously <9 on 11/04. Patient progressively more altered since arrival to [facility] on 11/6 without significant improvement with lactulose.     - Intubated due to acute hypoxic respiratory failure  - See acute encephalopathy  - Consider re-starting Lactulose enema 200 g BID, titrate to 3-4 BM daily  - Restart rifaximin via OGT  - OGT in place

## 2024-11-08 NOTE — ASSESSMENT & PLAN NOTE
Anemia is likely due to chronic disease due to Chronic liver disease. Most recent hemoglobin and hematocrit are listed below.  Recent Labs     11/06/24  0751 11/07/24  0330 11/08/24  0849   HGB 8.2* 7.6* 6.8*   HCT 24.8* 23.5* 20.7*       Plan  - Monitor serial CBC: Daily  - Transfuse PRBC if patient becomes hemodynamically unstable, symptomatic or H/H drops below 7/21.  - Patient has received 1 units of PRBCs on 11/4 , and another unit pRBC on 11/8   - Patient's anemia is currently stable  - Blood consent obtained upon arrival to [facility]

## 2024-11-08 NOTE — CODE/ RAPID DOCUMENTATION
RAPID RESPONSE NURSE NOTE        Admit Date: 2024  LOS: 2  Code Status: Full Code   Date of Consult: 2024  : 1965  Age: 59 y.o.  Weight:   Wt Readings from Last 1 Encounters:   24 76.7 kg (169 lb 1.5 oz)     Sex: female  Race: Black or    Bed: [address] [place] [address] A:   MRN: [id number]  Time Rapid Response Team page Received: 1700  Time Rapid Response Team at Bedside: 1701  Time Rapid Response Team left Bedside: 1720  Was the patient discharged from an ICU this admission? No   Was the patient discharged from a PACU within last 24 hours? No   Did the patient receive conscious sedation/general anesthesia in last 24 hours? No  Was the patient in the ED within the past 24 hours? No  Was the patient on NIPPV within the past 24 hours? No   Did this progress into an ARC or CPA: No  Attending Physician: Gabe Erwin MD  Primary Service: [facility] MED 3       SITUATION    Notified by bedside RN via phone call.  Reason for alert: SPO2 55%  Called to evaluate the patient for Respiratory.    BACKGROUND     Why is the patient in the hospital?: Hepatorenal syndrome    Patient has a past medical history of DONNA (acute kidney injury), Alcoholic cirrhosis of liver, and Thrombocytopenia.    Last Vitals:  Temp: 98 °F (36.7 °C) ( 154)  Pulse: 89 ( 1548)  Resp: 18 ( 154)  BP: 151/76 ( 1548)  SpO2: 88 % ( 1548)    24 Hours Vitals Range:  Temp:  [96 °F (35.6 °C)-98 °F (36.7 °C)]   Pulse:  []   Resp:  [16-18]   BP: (114-151)/(59-76)   SpO2:  [88 %-99 %]     Labs:  Recent Labs     24  0751 24  0330 24  0849   WBC 10.75 11.37 11.68   HGB 8.2* 7.6* 6.8*   HCT 24.8* 23.5* 20.7*   PLT 46* 41* 34*       Recent Labs     24  0330 24  1342 24  0849    137 139   K 3.6 3.4* 3.3*   CL 98 98 98   CO2 24 21* 18*   BUN 57* 58* 59*   CREATININE 9.2* 8.7* 9.6*    100 91   PHOS  --  4.9* 5.0*        Recent Labs     24  1722   PH 7.478*    PCO2 28.5*   PO2 55*   HCO3 21.1*   POCSATURATED 91   BE -2        ASSESSMENT    Called to evaluate patient for SPO2, 55%. Upon arrival patient satting 50% with non-rebreather, ineffective breathing pattern noted. Patient's eyes are open, but she is not verbal or responsive to stimuli. Palpable pulse. Immediately started providing rescue breaths via bag/mask/valve. SPO2 improved to 92%. Critical care at bedside, decision to emergently transfer patient to ICU for intubation.     INTERVENTIONS    The patient was seen for Respiratory problem. Staff concerns included new onset of difficulty breathing, oxygen saturation < 90% despite supplemental oxygen, and respiratory depression. The following interventions were performed: continuous pulse ox monitoring, continuous cardiac monitoring, and transfer to critical care medicine for emergent intubation.    RECOMMENDATIONS    We recommend:     -Transfer to ICU for emergent intubation    PROVIDER ESCALATION    Orders received and case discussed with Dr. Choi .    Primary team arrival time: n/a    Disposition: Tx in ICU bed 8763.    FOLLOW UP    Bedside Gillian IBRAHIM  updated on plan of care. Instructed to call the Rapid Response Nurse, Kathrin Powers RN at [phone] for additional questions or concerns.

## 2024-11-08 NOTE — PROGRESS NOTES
Pharmacokinetic Initial Assessment: IV Vancomycin    Assessment/Plan:    Oliguric DONNA. Nephrology consulted.  HRS vs ATN.    Initiate intravenous vancomycin with loading dose of 750 mg once with subsequent doses when random concentrations are less than 20 mcg/mL  Desired empiric serum trough concentration is 15 to 20 mcg/mL  Draw vancomycin random level on 11/9 at ~0500 with am labs..      Pharmacy will continue to follow and monitor vancomycin.      Please contact pharmacy at extension [phone] with any questions regarding this assessment.     Thank you for the consult,   Christiano Ng       Patient brief summary:  Rudolph Ojeda is a 59 y.o. female initiated on antimicrobial therapy with IV Vancomycin for treatment of suspected lower respiratory infection    Drug Allergies:   Review of patient's allergies indicates:  No Known Allergies    Actual Body Weight:   76.7 kg    Renal Function:   Estimated Creatinine Clearance: 6.3 mL/min (A) (based on SCr of 9.6 mg/dL (H)).,     Dialysis Method (if applicable):  HD if potential transplant candidate.    CBC (last 72 hours):  Recent Labs   Lab Result Units 11/06/24  0751 11/07/24  0330 11/08/24  0849   WBC K/uL 10.75 11.37 11.68   Hemoglobin g/dL 8.2* 7.6* 6.8*   Hematocrit % 24.8* 23.5* 20.7*   Platelets K/uL 46* 41* 34*   Gran % % 59.0 53.1 61.5   Lymph % % 30.0 32.5 24.6   Mono % % 11.0 11.0 11.0   Eosinophil % % 0.0 1.5 0.3   Basophil % % 0.0 0.3 0.2   Differential Method  Manual Automated Automated       Metabolic Panel (last 72 hours):  Recent Labs   Lab Result Units 11/06/24  0752 11/07/24  0330 11/07/24  1342 11/07/24  1538 11/08/24  0849   Sodium mmol/L 135* 136 137  --  139   Sodium, Urine mmol/L  --   --   --  23  --    Potassium mmol/L 3.3* 3.6 3.4*  --  3.3*   Chloride mmol/L 98 98 98  --  98   CO2 mmol/L 30 24 21*  --  18*   Glucose mg/dL 98 100 100  --  91   Glucose, UA   --   --   --  Negative  --    BUN mg/dL 60* 57* 58*  --  59*   Creatinine mg/dL 7.19* 9.2*  "8.7*  --  9.6*   Creatinine, Urine mg/dL  --   --   --  238.0  238.0  --    Albumin g/dL 2.9* 3.2* 3.4*  --  3.6   Total Bilirubin mg/dL 7.3* 6.3*  --   --  7.4*   Alkaline Phosphatase U/L 38 51  --   --  50   AST U/L 68* 58*  --   --  88*   ALT U/L 27 22  --   --  27   Phosphorus mg/dL  --   --  4.9*  --  5.0*       Drug levels (last 3 results):  No results for input(s): "VANCOMYCINRA", "VANCORANDOM", "VANCOMYCINPE", "VANCOPEAK", "VANCOMYCINTR", "VANCOTROUGH" in the last 72 hours.    Microbiologic Results:  Microbiology Results (last 7 days)       Procedure Component Value Units Date/Time    Blood culture [[id number]] Collected: 11/08/24 0849    Order Status: Completed Specimen: Blood Updated: 11/08/24 1715     Blood Culture, Routine No Growth to date    Blood culture [[id number]] Collected: 11/08/24 0849    Order Status: Completed Specimen: Blood Updated: 11/08/24 1715     Blood Culture, Routine No Growth to date    Urine culture [[id number]] Collected: 11/07/24 1538    Order Status: No result Specimen: Urine Updated: 11/07/24 1942            "

## 2024-11-08 NOTE — EICU
Intervention Initiated From:  Bedside    Kuldeep intervened regarding:  Documentation and Time-Out    Nurse Notified:  Yes    Doctor Notified:  Yes    Comments: Pt is a RR from [facility] with AMS and Respiratory distress. Tx to ICU for RSI. Pt is being bagged with 100% Fi02. NRB applied prior to intubation. Dr. Choi at bedside.   1724 30 mg etomidate and 80 mg rocuronium given.  1727- intubated with eet 8.0 22 cm at the lip. Color change noted. Jose breath sounds. Xray ordered.

## 2024-11-08 NOTE — ASSESSMENT & PLAN NOTE
59-y/o w/ alcoholic cirrhosis, ascites, HCV originally transferred to [facility] from [facility] on 11/01 with acute kidney injury following paracentesis (5 L) with c/f hepatorenal syndrome. Patient reports that her last alcohol intake was several months ago. On admission /52, HR 95, RR 14, SpO2 98%, T 97.6°. Labs (11/2) WBC 15.5, Hb 7.7, Plts 55, INR 2.5, Na 135, K 3.3, Cr 2.27, T bili 10.0, , ALT 57, ammonia 60. BL renal US showed no evidence of hydronephrosis or acute changes.     Patient started on hepatorenal protocol (octreotide, albumin), lactulose and midodrine (10 mg Q 8 h) with SBP mostly in the mid-teens. Renal function has continued to decline following admission with Cr increasing from 2.27 > 6.26 over the past 5 days with minimal UO (Tong). Hb fell from 7.7 > 6.5 improving to 8.1 following pRBC x1. LFTs have been improving (T bili 10.0 > 7.7,  > 67, ALT 57 > 27, ammonia 60 > <9.) MELD has incr (37 > 40). Transfer requested for hepatology for further w/u and eval of A/C liver failure on background of alcoholic cirrhosis, and plans for continued Nephrology input for mx of hepatorenal syndrome.    - Continue HRS treatment   - Emerging potential for HD needs for which Nephro was consulted upon pt arrival to [facility]  - appreciate nephrology and hepatology recs

## 2024-11-08 NOTE — ASSESSMENT & PLAN NOTE
Patient with abdominal pain in setting of decompensated cirrhosis.   - Consider diagnostic paracentesis to evaluate for SBP   - Broadened from Ceftriaxone to meropenem given decline in patients condition despite ceftriaxone and lactulose   - Consider CT abdomen pelvis if patient mental status improves and she is able to lie still for imaging

## 2024-11-08 NOTE — ASSESSMENT & PLAN NOTE
Patient is anuric since admission. Tong placed on 11/7 with only 10 cc output. Tong removed, but patient without any urine output per nursing staff. Patient also with poor po intake due to her altered mental status.

## 2024-11-08 NOTE — PLAN OF CARE
Problem: Adult Inpatient Plan of Care  Goal: Plan of Care Review  Outcome: Progressing  Goal: Patient-Specific Goal (Individualized)  Outcome: Progressing  Goal: Absence of Hospital-Acquired Illness or Injury  Outcome: Progressing  Goal: Optimal Comfort and Wellbeing  Outcome: Progressing  Goal: Readiness for Transition of Care  Outcome: Progressing     Problem: Acute Kidney Injury/Impairment  Goal: Fluid and Electrolyte Balance  Outcome: Progressing  Goal: Improved Oral Intake  Outcome: Progressing  Goal: Effective Renal Function  Outcome: Progressing     Problem: Skin Injury Risk Increased  Goal: Skin Health and Integrity  Outcome: Progressing     Problem: Infection  Goal: Absence of Infection Signs and Symptoms  Outcome: Progressing

## 2024-11-09 PROBLEM — Z51.5 COMFORT MEASURES ONLY STATUS: Status: ACTIVE | Noted: 2024-01-01

## 2024-11-09 NOTE — ASSESSMENT & PLAN NOTE
- Hepatology following  - Consider diagnostic paracentesis pending CT Abdomen Pelvis  - Continue meropenem and vancomycin for possible SBP

## 2024-11-09 NOTE — HOSPITAL COURSE
While admitted to , she was started on SBP prophylaxis with ceftriaxone. She became progressively more altered over the course of the past 2 days. Ammonia was elevated at 129, and her AMS was attributed to hepatic encephalopathy. Her mentation continued to worsen, despite lactulose. On , Hgb dropped < 7 and received 1 unit pRBC. While being transfused, nurse noticed a change in breath sounds and she desaturated to 70's and was placed on 3L NC. Transfusion was stopped. She then desaturated to 50's and was supported with ambu bag while transported to ICU, then immediately intubated upon arrival to MICU. Admitted to MICU for acute hypoxic respiratory failure. Concern for TACO or TRALI vs ARDS. She underwent CRRT and required maximum ventilatory support with an FiO2 of 100% and PEEP of 12 to maintain any oxygenation above 70%.      Critical care team discussed the severity of the patient's condition with multiple family members present on the morning of 24 who shared that they had been discussing the goals of care for her mother extensively the night previously. In light of the patient's critical illness requiring multiple forms of life support, the family was in agreement that her current level of care and continuation of aggressive interventions would not be in line with her wishes and opted to pursue comfort care only and palliative extubation was performed with family at bedside. Shortly after, the patient  and was pronounced  at 11:45AM, 2024.

## 2024-11-09 NOTE — ASSESSMENT & PLAN NOTE
Patient presented to [facility] on 11/6 and since arrival has become progressively more altered. On 11/8, she was only opening eyes and not able to follow commands, respond to questions, and appeared to be more lethargic. Initially, thought to be 2/2 hepatic encephalopathy. However her renal function continues to decline as well. While receiving a unit of blood today (11/8), she became acutely hypoxic requiring intubation.     Plan  - CT Head ordered  - CT A/P ordered  - Discuss possible dialysis with nephrology, and if indicated will need trialysis placement

## 2024-11-09 NOTE — HPI
Rudolph Ojeda is a 59 y.o. F with hx of alcoholic cirrhosis status post recent paracentesis 10/30/2024 (5 L removed) who was being seen at [facility] as transfer from [facility] where she presented with bilateral lower extremity edema with lower extremity ultrasound negative for DVT. Patient transferred secondary to acute kidney injury and bilateral pleural effusions. Prior to transfer, patient denying fevers, dyspnea, nausea, vomiting or increasing abdominal girth status post paracentesis. Patient admitted to [facility] with probable hepatorenal syndrome w/ gradually worsening ascites s/p para a few days prior. Nephrology and Gastroenterology were consulted. Pt claims her last alcohol intake was about 2-1/2 months ago. She was started on hepatorenal protocol. She was also started on Lasix and Aldactone. Bilateral renal ultrasound studies showed no evidence of hydronephrosis. During stay at OSH, hemoglobin dropped and renal function continued to worsen despite treatment. After discussion with Gastroenterology and Nephrology, decision made to transfer patient to a tertiary care center for hepatology evaluation. On arrival to [facility] on 11/6, patient alert and oriented and HDS. Pt admitted to hospital medicine w/ plans for Hepatology and Nephrology consultation for further workup and management of hepatorenal syndrome and liver failure.     While admitted to , she was started on SBP prophylaxis with ceftriaxone. She became progressively more altered over the course of the past 2 days. Ammonia was elevated at 129, and her AMS was attributed to hepatic encephalopathy. Her mentation continued to worsen, despite lactulose. On 11/8, Hgb dropped < 7 and received 1 unit pRBC. While being transfused, nurse noticed a change in breath sounds and she desaturated to 70's and was placed on 3L NC. She then desaturated to 50's and was supported with ambu bag while transported to ICU, then immediately intubated upon  arrival to MICU. Admitted to MICU for acute hypoxic respiratory failure.

## 2024-11-09 NOTE — ASSESSMENT & PLAN NOTE
History of cirrhosis due to alcohol use, Hepatitis C, ascites transferred to [facility] from [facility] on 11/01 with DONNA following paracentesis with c/f hepatorenal syndrome who was transferred to [facility] 11/06 for further evaluation of acute on chronic liver failure    MELD 3.0: 37 at 11/8/2024  8:49 AM  MELD-Na: 37 at 11/8/2024  8:49 AM  Calculated from:  Serum Creatinine: 9.6 mg/dL (Using max of 3 mg/dL) at 11/8/2024  8:49 AM  Serum Sodium: 139 mmol/L (Using max of 137 mmol/L) at 11/8/2024  8:49 AM  Total Bilirubin: 7.4 mg/dL at 11/8/2024  8:49 AM  Serum Albumin: 3.6 g/dL (Using max of 3.5 g/dL) at 11/8/2024  8:49 AM  INR(ratio): 2.4 at 11/8/2024  8:49 AM  Age at listing (hypothetical): 59 years  Sex: Female at 11/8/2024  8:49 AM    - Hepatorenal Syndrome protocol  - Furosemide 120 mg IV once  - Octreotide 100 mcg Q8H  - Albumin on hold  - Lactulose and Rifaximin on hold  - Midodrine discontinued  - Hepatology following  - Nephrology following

## 2024-11-09 NOTE — EICU
Intervention Initiated From:  Bedside    Kuldeep intervened regarding:  Documentation and Time-Out    Comments: Called into room via eLert to do time-out for Trialysis placement & for assistance w/ documentation (see time-out record).  LDA added in Epic.

## 2024-11-09 NOTE — CARE UPDATE
Called to bedside by patient's nurse. Nursing supervisor notified. Family at bedside.     Patient is not responding to verbal or tactile stimuli. Patient does not have a pupillary or corneal reflex. Her pupils are fixed and dilated. No heart or breath sounds on auscultation. No respirations. No palpable pulses.     Date and Time of Death: 11/9/2024 @ 1145AM    Cause of Death: Multi-organ failure    MARYLU email: [email or web address]    Luis Gonzalez MD  PGY-1, [facility]

## 2024-11-09 NOTE — PROGRESS NOTES
Pharmacokinetic Follow-Up Assessment: IV Vancomycin    Assessment/Plan:    - Vanc AM random is subtherapeutic at 9.2 mcg/mL  - Dosing by level in setting of DONNA  - Continuous SLED started   - Administer vanc 1250 mg x 1  - Draw 12- hour random level tonight at 20:00  - Will re-dose based on level and RRT plan    Pharmacy will continue to follow and monitor vancomycin.    Please contact pharmacy at extension [phone] with any questions regarding this assessment.     Thank you for the consult,   Ninoska Rojas, PharmD, [facility]  Neurocritical Care Pharmacist  [id number]         Patient brief summary:  Rudolph Ojeda is a 59 y.o. female initiated on antimicrobial therapy with IV Vancomycin for treatment of suspected lower respiratory infection    Drug Allergies:   Review of patient's allergies indicates:  No Known Allergies    Actual Body Weight:   76.7 kg    Renal Function:   Estimated Creatinine Clearance: 7.6 mL/min (A) (based on SCr of 7.9 mg/dL (H)).,     Dialysis Method (if applicable):  HD if potential transplant candidate.    CBC (last 72 hours):  Recent Labs   Lab Result Units 11/06/24  0751 11/07/24  0330 11/08/24  0849 11/08/24 1820 11/09/24  0137   WBC K/uL 10.75 11.37 11.68 15.07* 12.33   Hemoglobin g/dL 8.2* 7.6* 6.8* 7.6* 7.3*   Hematocrit % 24.8* 23.5* 20.7* 23.7* 22.3*   Platelets K/uL 46* 41* 34* 43* 47*   Gran % % 59.0 53.1 61.5 71.6 67.0   Lymph % % 30.0 32.5 24.6 19.7 15.0*   Mono % % 11.0 11.0 11.0 5.0 10.0   Eosinophil % % 0.0 1.5 0.3 0.3 0.0   Basophil % % 0.0 0.3 0.2 0.9 0.0   Differential Method  Manual Automated Automated Automated Manual       Metabolic Panel (last 72 hours):  Recent Labs   Lab Result Units 11/06/24  0752 11/07/24  0330 11/07/24  1342 11/07/24  1538 11/08/24  0849 11/08/24  1820 11/09/24  0137 11/09/24  0505 11/09/24  0628   Sodium mmol/L 135* 136 137  --  139 138 138 138 137   Sodium, Urine mmol/L  --   --   --  23  --   --   --   --   --    Potassium mmol/L 3.3* 3.6 3.4*  --  3.3*  3.4* 3.9 3.7 3.8   Chloride mmol/L 98 98 98  --  98 98 97 98 100   CO2 mmol/L 30 24 21*  --  18* 17* 17* 16* 18*   Glucose mg/dL 98 100 100  --  91 82 55* 80 64*   Glucose, UA   --   --   --  Negative  --   --   --   --   --    BUN mg/dL 60* 57* 58*  --  59* 63* 63* 65* 47*   Creatinine mg/dL 7.19* 9.2* 8.7*  --  9.6* 9.9* 10.3* 10.1* 7.9*   Creatinine, Urine mg/dL  --   --   --  238.0  238.0  --   --   --   --   --    Albumin g/dL 2.9* 3.2* 3.4*  --  3.6 4.0  --  3.4*  --    Total Bilirubin mg/dL 7.3* 6.3*  --   --  7.4*  --   --  10.4*  --    Alkaline Phosphatase U/L 38 51  --   --  50  --   --  51  --    AST U/L 68* 58*  --   --  88*  --   --  119*  --    ALT U/L 27 22  --   --  27  --   --  33  --    Magnesium mg/dL  --   --   --   --   --  2.2  --   --   --    Phosphorus mg/dL  --   --  4.9*  --  5.0* 6.9*  --  7.7*  --        Drug levels (last 3 results):  Recent Labs   Lab Result Units 11/09/24  0137   Vancomycin, Random ug/mL 9.2       Microbiologic Results:  Microbiology Results (last 7 days)       Procedure Component Value Units Date/Time    Urine culture [[id number]]  (Abnormal) Collected: 11/07/24 1538    Order Status: Completed Specimen: Urine Updated: 11/08/24 1819     Urine Culture, Routine JEANETTE ALBICANS  10,000 - 49,999 cfu/ml  Treatment of asymptomatic candiduria is not recommended (except for   specific populations). Candida isolated in the urine typically   represents colonization. If an indwelling urinary catheter is present  it should be removed or replaced.      Narrative:      Add on UAR to [id number] per Joana Kerr MD on  11/07/2024  18:55 .        Specimen Source->Urine    Blood culture [[id number]] Collected: 11/08/24 0849    Order Status: Completed Specimen: Blood Updated: 11/08/24 1715     Blood Culture, Routine No Growth to date    Blood culture [[id number]] Collected: 11/08/24 0849    Order Status: Completed Specimen: Blood Updated: 11/08/24 1715     Blood Culture, Routine No  Growth to date

## 2024-11-09 NOTE — H&P
[facility] Medical [facility]  Critical Care Medicine  History & Physical    Patient Name: Rudolph Ojeda  MRN: [id number]  Admission Date: 11/6/2024  Hospital Length of Stay: 2 days  Code Status: Full Code  Attending Physician: Gabe Erwin MD   Primary Care Provider: No, Primary Doctor   Principal Problem: Hepatorenal syndrome    Subjective:     HPI:  Rudolph Ojeda is a 59 y.o. F with hx of alcoholic cirrhosis status post recent paracentesis 10/30/2024 (5 L removed) who was being seen at [facility] as transfer from [facility] where she presented with bilateral lower extremity edema with lower extremity ultrasound negative for DVT. Patient transferred secondary to acute kidney injury and bilateral pleural effusions. Prior to transfer, patient denying fevers, dyspnea, nausea, vomiting or increasing abdominal girth status post paracentesis. Patient admitted to [facility] with probable hepatorenal syndrome w/ gradually worsening ascites s/p para a few days prior. Nephrology and Gastroenterology were consulted. Pt claims her last alcohol intake was about 2-1/2 months ago. She was started on hepatorenal protocol. She was also started on Lasix and Aldactone. Bilateral renal ultrasound studies showed no evidence of hydronephrosis. During stay at OSH, hemoglobin dropped and renal function continued to worsen despite treatment. After discussion with Gastroenterology and Nephrology, decision made to transfer patient to a tertiary care center for hepatology evaluation. On arrival to [facility] on 11/6, patient alert and oriented and HDS. Pt admitted to hospital medicine w/ plans for Hepatology and Nephrology consultation for further workup and management of hepatorenal syndrome and liver failure.     While admitted to , she was started on SBP prophylaxis with ceftriaxone. She became progressively more altered over the course of the past 2 days. Ammonia was elevated at 129, and her AMS was attributed to hepatic  encephalopathy. Her mentation continued to worsen, despite lactulose. On 11/8, Hgb dropped < 7 and received 1 unit pRBC. While being transfused, nurse noticed a change in breath sounds and she desaturated to 70's and was placed on 3L NC. She then desaturated to 50's and was supported with ambu bag while transported to ICU, then immediately intubated upon arrival to MICU. Admitted to MICU for acute hypoxic respiratory failure.    Hospital/ICU Course:  No notes on file     Past Medical History:   Diagnosis Date    DONNA (acute kidney injury)     Alcoholic cirrhosis of liver     Thrombocytopenia        Past Surgical History:   Procedure Laterality Date    APPENDECTOMY         Review of patient's allergies indicates:  No Known Allergies    Family History    None       Tobacco Use    Smoking status: Every Day     Current packs/day: 0.25     Average packs/day: 0.3 packs/day for 40.0 years (10.0 ttl pk-yrs)     Types: Cigarettes    Smokeless tobacco: Not on file   Substance and Sexual Activity    Alcohol use: Not Currently     Comment: claims quit 9/2024    Drug use: Not on file    Sexual activity: Not on file      Review of Systems  Objective:     Vital Signs (Most Recent):  Temp: 98 °F (36.7 °C) (11/08/24 1548)  Pulse: (!) 114 (11/08/24 1734)  Resp: 20 (11/08/24 1734)  BP: (!) 158/93 (11/08/24 1734)  SpO2: 98 % (11/08/24 1734) Vital Signs (24h Range):  Temp:  [96 °F (35.6 °C)-98 °F (36.7 °C)] 98 °F (36.7 °C)  Pulse:  [] 114  Resp:  [16-20] 20  SpO2:  [88 %-99 %] 98 %  BP: (114-158)/(59-93) 158/93   Weight: 76.7 kg (169 lb 1.5 oz)  Body mass index is 29.95 kg/m².      Intake/Output Summary (Last 24 hours) at 11/8/2024 1819  Last data filed at 11/8/2024 1700  Gross per 24 hour   Intake 173.75 ml   Output 1 ml   Net 172.75 ml          Physical Exam  Constitutional:       General: She is in acute distress.      Appearance: She is ill-appearing and toxic-appearing.      Comments: Sedated and intubated   Eyes:       General: Scleral icterus present.      Pupils: Pupils are unequal, R sluggish  Cardiovascular:      Rate and Rhythm: Regular rhythm. Tachycardia present.   Pulmonary:      Breath sounds: Rales (B/L throughout) present.   Abdominal:      General: There is distension.      Tenderness: There is abdominal tenderness. There is no guarding.   Musculoskeletal:         General: Swelling present.      Right lower leg: Edema present.      Left lower leg: Edema present.   Neurological:      Comments: Sedated, not answering questions  Prior to intubation, not following commands or answering questions. Open eyes to verbal stimulus            Vents:  Vent Mode: A/C (11/08/24 1734)  Ventilator Initiated: Yes (11/08/24 1735)  Set Rate: 20 BPM (11/08/24 1734)  Vt Set: 315 mL (11/08/24 1734)  PEEP/CPAP: 5 cmH20 (11/08/24 1734)  Oxygen Concentration (%): 100 (11/08/24 1734)  Peak Airway Pressure: 39 cmH20 (11/08/24 1734)  Plateau Pressure: 0 cmH20 (11/08/24 1734)  Total Ve: 6.48 L/m (11/08/24 1734)  Negative Inspiratory Force (cm H2O): 0 (11/08/24 1734)  F/VT Ratio<105 (RSBI): (!) 61.73 (11/08/24 1734)  Lines/Drains/Airways       Drain  Duration             Female External Urinary Catheter w/ Suction 11/07/24 1030 1 day         NG/OG Tube 11/08/24 1736 [facility] mouth <1 day              Airway  Duration                  Airway - Non-Surgical 11/08/24 1727 Endotracheal Tube <1 day              Peripheral Intravenous Line  Duration                  Midline Catheter - Single Lumen 11/07/24 1825 Right basilic vein (medial side of arm) 18g x 10cm <1 day                  Significant Labs:    CBC/Anemia Profile:  Recent Labs   Lab 11/07/24  0330 11/07/24  1217 11/08/24  0849   WBC 11.37  --  11.68   HGB 7.6*  --  6.8*   HCT 23.5*  --  20.7*   PLT 41*  --  34*   *  --  102*   RDW 18.8*  --  19.1*   FOLATE  --  4.5  --    [id number]  --  >2000*  --         Chemistries:  Recent Labs   Lab 11/07/24  0330 11/07/24  1343  11/08/24  0849    137 139   K 3.6 3.4* 3.3*   CL 98 98 98   CO2 24 21* 18*   BUN 57* 58* 59*   CREATININE 9.2* 8.7* 9.6*   CALCIUM 8.8 9.1 9.7   ALBUMIN 3.2* 3.4* 3.6   PROT 4.8*  --  5.4*   BILITOT 6.3*  --  7.4*   ALKPHOS 51  --  50   ALT 22  --  27   AST 58*  --  88*   PHOS  --  4.9* 5.0*       All pertinent labs within the past 24 hours have been reviewed.    Significant Imaging: I have reviewed all pertinent imaging results/findings within the past 24 hours.  Assessment/Plan:     Neuro  Acute encephalopathy  Patient presented to [facility] on 11/6 and since arrival has become progressively more altered. On 11/8, she was only opening eyes and not able to follow commands, respond to questions, and appeared to be more lethargic. Initially, thought to be 2/2 hepatic encephalopathy. However her renal function continues to decline as well. While receiving a unit of blood today (11/8), she became acutely hypoxic requiring intubation.     Plan  - CT Head ordered  - CT A/P ordered  - Discuss possible dialysis with nephrology, and if indicated will need trialysis placement    Pulmonary  Acute hypoxic respiratory failure  Patient with Hypoxic Respiratory failure which is Acute.  she is not on home oxygen. Supplemental oxygen was provided and noted- Vent Mode: A/C  Oxygen Concentration (%):  [100] 100  Resp Rate Total:  [0 br/min-20 br/min] 20 br/min  Vt Set:  [315 mL-365 mL] 315 mL  PEEP/CPAP:  [5 cmH20] 5 cmH20  Mean Airway Pressure:  [0 ftR23-43 cmH20] 11 cmH20    Signs/symptoms of respiratory failure include- respiratory distress. Contributing diagnoses includes - Aspiration, Pleural effusion, and hepatorenal syndrome versus pulmonary edema.  Labs and images were reviewed. Patient Has recent ABG, which has been reviewed. Will treat underlying causes and adjust management of respiratory failure as follows- mechanical ventilation, antibiotics.    - ABG 7.478/28.5/55/21.1  - Continue meropenem and vancomycin  - Furosemide  120 mg IV once  - Glucose checks Q4H  - Pantoprazole 40 mg IV BID    Renal/  * Hepatorenal syndrome  History of cirrhosis due to alcohol use, Hepatitis C, ascites transferred to [facility] from [facility] on 11/01 with DONNA following paracentesis with c/f hepatorenal syndrome who was transferred to [facility] 11/06 for further evaluation of acute on chronic liver failure    MELD 3.0: 37 at 11/8/2024  8:49 AM  MELD-Na: 37 at 11/8/2024  8:49 AM  Calculated from:  Serum Creatinine: 9.6 mg/dL (Using max of 3 mg/dL) at 11/8/2024  8:49 AM  Serum Sodium: 139 mmol/L (Using max of 137 mmol/L) at 11/8/2024  8:49 AM  Total Bilirubin: 7.4 mg/dL at 11/8/2024  8:49 AM  Serum Albumin: 3.6 g/dL (Using max of 3.5 g/dL) at 11/8/2024  8:49 AM  INR(ratio): 2.4 at 11/8/2024  8:49 AM  Age at listing (hypothetical): 59 years  Sex: Female at 11/8/2024  8:49 AM    - Hepatorenal Syndrome protocol  - Furosemide 120 mg IV once  - Octreotide 100 mcg Q8H  - Albumin on hold  - Lactulose and Rifaximin on hold  - Midodrine discontinued  - Hepatology following  - Nephrology following      Anuria  Plan  - worrell placed  - S/p 120 mg Lasix. Monitor UOP post-lasix.    DONNA (acute kidney injury)  Recent Labs     11/07/24  0330 11/07/24  1342 11/08/24  0849   CREATININE 9.2* 8.7* 9.6*   BUN 57* 58* 59*     Treated at OSH as Hepatorenal syndrome. Diuretics were held. Retroperitoneal U/S was without obstruction. Urinalysis with 2+ protein is not entirely consistent with HRS. Kidney function continues to worsen despite consistently maintaining MAP > 65. Consider other diagnoses in differential, including AIN and ATN.    Plan  - Will collect urine to spin for microscopy to evaluate for other etiologies  - Consider Trialysis line placement for uremia and/or volume overload  - Worrell placed  - Strict I&Os    Hematology  Coagulopathy  PT 24, INR 2.4.     Plan    - Vitamin K  - F/u repeat CBC and coags    Oncology  Anemia  in other chronic diseases classified elsewhere  Likely chronic and secondary to liver disease    - Daily CBC  - Transfuse per critical care guidelines to maintain Hgb >7  - Has received 1 unit of PRBCs at OSH and 1 unit this hospitalization    Endocrine  Malnutrition  - Consult dietitian for tube feeds in AM    GI  Abdominal pain  Abdominal pain in the setting of decompensated cirrhosis    - Consider diagnostic paracentesis to evaluate for SBP  - Antibiotics escalated to meropenem  - Consider CT Abdomen Pelvis pending patient's hemodynamic instability    Hepatic encephalopathy  Ammonia elevated to 129 on 11/07, previously <9 on 11/04. Patient progressively more altered since arrival to [facility] on 11/6 without significant improvement with lactulose.     - Intubated due to acute hypoxic respiratory failure  - See acute encephalopathy  - Consider re-starting Lactulose enema 200 g BID, titrate to 3-4 BM daily  - Restart rifaximin via OGT  - OGT in place    Ascites due to alcoholic cirrhosis    - Hepatology following  - Consider diagnostic paracentesis pending CT Abdomen Pelvis  - Continue meropenem and vancomycin for possible SBP    Palliative Care  ACP (advance care planning)  She has several adult children, however lives with her sister. Adult children live out of state, but have travelled to [place] to visit her. If children would like to defer decision making to her sister, then this should be appropriate.        Critical Care Daily Checklist:    A: Awake: RASS Goal/Actual Goal:    Actual:     B: Spontaneous Breathing Trial Performed?     C: SAT & SBT Coordinated?                     D: Delirium: CAM-ICU     E: Early Mobility Performed? No   F: Feeding Goal: Goals: Meet % EEN, EPN by RD f/u date  Status: Nutrition Goal Status: new   Current Diet Order   Procedures    Diet NPO      AS: Analgesia/Sedation Fentanyl, Propofol   T: Thromboembolic Prophylaxis Contraindicated, coagulopathy   H: HOB > 300 Yes   U:  Stress Ulcer Prophylaxis (if needed) IV PPI   G: Glucose Control None   B: Bowel Function Stool Occurrence: 0   I: Indwelling Catheter (Lines & Tong) Necessity Tong  PICC   D: De-escalation of Antimicrobials/Pharmacotherapies Vanc/Meropenem    Plan for the day/ETD CT head/abdomen/pelvis    Code Status:  Family/Goals of Care: Full Code         Critical secondary to Patient has a condition that poses threat to life and bodily function: Severe Respiratory Distress and Acute Renal Failure    Critical care was time spent personally by me on the following activities: development of treatment plan with patient or surrogate and bedside caregivers, discussions with consultants, evaluation of patient's response to treatment, examination of patient, ordering and performing treatments and interventions, ordering and review of laboratory studies, ordering and review of radiographic studies, pulse oximetry, re-evaluation of patient's condition. This critical care time did not overlap with that of any other provider or involve time for any procedures.     Geeta Hernandez MD  Critical Care Medicine  [facility] - Medical ICU

## 2024-11-09 NOTE — ASSESSMENT & PLAN NOTE
She has several adult children, however lives with her sister. Adult children live out of state, but have travelled to [place] to visit her. If children would like to defer decision making to her sister, then this should be appropriate.

## 2024-11-09 NOTE — ASSESSMENT & PLAN NOTE
Recent Labs     11/07/24  0330 11/07/24  1342 11/08/24  0849   CREATININE 9.2* 8.7* 9.6*   BUN 57* 58* 59*     Treated at OSH as Hepatorenal syndrome. Diuretics were held. Retroperitoneal U/S was without obstruction. Urinalysis with 2+ protein is not entirely consistent with HRS. Kidney function continues to worsen despite consistently maintaining MAP > 65. Consider other diagnoses in differential, including AIN and ATN.    Plan  - Will collect urine to spin for microscopy to evaluate for other etiologies  - Consider Trialysis line placement for uremia and/or volume overload  - Tong placed  - Strict I&Os

## 2024-11-09 NOTE — CONSULTS
[facility] - Medical ICU  Adult Nutrition  Consult Note    SUMMARY     Recommendations  Continuous TF recommendation: Isosource 1.5 @ 35 mL/hr to provide 840 mL total volume, 1260 kcal, 141 g CHO, 57 protein, 13 fiber, 653 mL free water, 84% DRI         --155 mL flush q4 hrs to provide additional 930 mL free water (Total 1583 mL)     Bolus TF recommendation: 3 cans/day of Isosource 1.5 to provide 750  mL total volume, 1125 kcal, 132 g CHO, 51 protein, 11 fiber, 573 mL free water, 75% DRI         --165 mL flush before and after each can to provide additional 990 mL free water (Total 1563 mL)     --Monitor for s/s of intolerance     --If PO intake desired, recommend Renal diet as tolerated and clinically indicated    Goals: --Meet % EEN/EPN  Nutrition Goal Status: new  Communication of RD Recs: other (comment) (POC)    Assessment and Plan    Nutrition Problem  Inadequate oral intake    Related to (etiology):   Clinical course     Signs and Symptoms (as evidenced by):   NPO status      Interventions/Recommendations (treatment strategy):  Collaboration of nutrition care with other providers     Nutrition Diagnosis Status:   New         Malnutrition Assessment             Subcutaneous Fat (Malnutrition): mild depletion  Muscle Mass (Malnutrition): mild depletion  Fluid Accumulation (Malnutrition): mild   Orbital Region (Subcutaneous Fat Loss): mild depletion  Upper Arm Region (Subcutaneous Fat Loss): mild depletion   [place] Region (Muscle Loss): mild depletion  Clavicle Bone Region (Muscle Loss): mild depletion  Dorsal Hand (Muscle Loss): mild depletion                 Reason for Assessment    Reason For Assessment: consult (Intubated, hepatic and renal failure)  Diagnosis: other (see comments) (Hepatorenal syndrome)  General Information Comments: 59 y.o. F with hx of alcoholic cirrhosis status post recent paracentesis 10/30/2024 (5 L removed) who was being seen at [facility] as transfer from [facility]  [facility] where she presented with bilateral lower extremity edema with lower extremity ultrasound negative for DVT. RD consult for intubated, hepatic and renal failure. See TF recommendations above.    Nutrition Discharge Planning: Adequate nutritional intake    Nutrition Related Social Determinants of Health: SDOH: Unable to assess at this time.       Nutrition Risk Screen    Nutrition Risk Screen: no indicators present    Nutrition/Diet History    Spiritual, Cultural Beliefs, [employer] Practices, Values that Affect Care: no  Food Allergies: NKFA  Factors Affecting Nutritional Intake: NPO, on mechanical ventilation    Anthropometrics    Temp: 97 °F (36.1 °C)  Height Method: Stated  Height: 5' 3" (160 cm)  Height (inches): 63 in  Weight: 76.7 kg (169 lb 1.5 oz)  Weight (lb): 169.09 lb  Ideal Body Weight (IBW), Female: 115 lb  % Ideal Body Weight, Female (lb): 147.03 %  BMI (Calculated): 30  BMI Grade: 30 - 34.9- obesity - grade I  Usual Body Weight (UBW), kg:  (BRIANDA)       Lab/Procedures/Meds    Pertinent Labs Reviewed: reviewed - hemoglobin 7.3, hematocrit 22.3, , MCH 33.5, vitamin B12 >2000, Bun 47, creatinine 7.9, eGFR 5.5, glucose 64, P 7.7,      Pertinent Medications Reviewed: reviewed - pantoprazole, propofol     Estimated/Assessed Needs    Weight Used For Calorie Calculations: 76.7 kg (169 lb 1.5 oz)  Energy Calorie Requirements (kcal): 1578 kcal/day  Energy Need Method: [place]  Protein Requirements: 77-92 g/day (1.0-1.2 g/kg)  Weight Used For Protein Calculations: 76.7 kg (169 lb 1.5 oz)     Estimated Fluid Requirement Method: RDA Method  RDA Method (mL): 1578  CHO Requirement: 197 g/day      Nutrition Prescription Ordered    Current Diet Order: NPO    Evaluation of Received Nutrient/Fluid Intake    % Intake of Estimated Energy Needs: NPO  % Meal Intake: NPO    Nutrition Risk    Level of Risk/Frequency of Follow-up: high     Monitor and Evaluation    Food and Nutrient Intake: enteral nutrition " intake  Food and Nutrient Adminstration: enteral and parenteral nutrition administration  Knowledge/Beliefs/Attitudes: food and nutrition knowledge/skill  Physical Activity and Function: nutrition-related ADLs and IADLs  Anthropometric Measurements: weight, weight change, body mass index  Biochemical Data, Medical Tests and Procedures: electrolyte and renal panel, gastrointestinal profile, glucose/endocrine profile, inflammatory profile, lipid profile  Nutrition-Focused Physical Findings: overall appearance       Nutrition Follow-Up    RD Follow-up?: Yes    Cara Navarro, Registration Eligible, Provisional LDN

## 2024-11-09 NOTE — PROGRESS NOTES
"[facility] - Medical / Clinical  Nephrology  CONSULT Note      Patient Name: Rudolph Ojeda   MRN: [id number]   Current Provider: Lubna Gonzalez MD  Primary Care Provider: Blaire Primary Doctor   Admission Date: 11/6/2024   Hospital Day: 3  Bed: 7095/7095 A  Principal Problem: Hepatorenal syndrome       SUBJECTIVE    Rudolph Ojeda is a 59 y.o. female ,is admitted on 11/6/2024  with past medical history of   tobacco use, cirrhosis secondary to EtOH abuse status post recent paracentesis 10/30/2024 per radiology with 5 L.    She was initially presented to Outside facility ([facility]) for BL lower limb edema and ascites. She had paracentesis there, and was started on HRS protocol including octreotide, albumin, lactulose and midodrine (10 mg Q 8 h). Her BP was initially ranged between 110-119. Her renal functions was found to have gotten worse  despite the treatment. Cr raised from 2.27 to 9.2 in 7 days. Her HB was low and she received one unit of PRBC.     Nephrology was consulted for the possible need of dialysis.      INTERVAL HISTORY:  Nurse informed that she is worse then yesterday. Had  a bed night. Complaining of the abdominal pain. Pain medications given but not helping. Moving in bed due to abdominal pain. Not communicating. See with the whole family at the bed side. Informed them about the poor prognosis.   Hepatology is involved and they are evaluating her for candidacy of liver transplant. She will move to the hepatology care tomorrow.    OBJECTIVE     Vitals:  BP (!) 104/41   Pulse 83   Temp 97 °F (36.1 °C) (Axillary)   Resp (!) 36   Ht 5' 3" (1.6 m)   Wt 76.7 kg (169 lb 1.5 oz)   SpO2 (!) 80%   BMI 29.95 kg/m²    I/O last 3 completed shifts:  In: 1993.9 [I.V.:787.5; Blood:173.8; NG/GT:150; IV Piggyback:882.7]  Out: 326 [Urine:30; Other:295; Stool:1]   Net IO Since Admission: 1,927.94 mL [11/09/24 0842]    Physical Examination:  Constitutional: Chronically ill appearing. Complaining of " abdominal pain.  HEENT: Atraumatic. Icteric  Chest:  Chest is clear BL. No Crackles. No wheezes  Cardiovascular:  S1+S2+0.   Abdominal: [place], distended abdomin with generalized tenderness  Extremities: Bilateral lower extremity edema 1+  No following commands.    LABS:  US: no hydronephrosis  CXR 11/1: [place] son the L lower zone - no previous CXR for comparison  Hep C AB - High  JORDAN titers 1:80  Ascitic tap -      ASSESSMENT AND PLAN:    Rudolph Ojeda is a 59 y.o. female ,is admitted on 11/6/2024  with past medical history of   tobacco use, cirrhosis secondary to EtOH abuse status post recent paracentesis 10/30/2024 per radiology with 5 L .  Transfer from [facility] for hepatology eval. She is admitted with HRS - renals getting worse despite treatment    Nephrology  is consulted for the potential need for dialysis.      Impression:  DONNA stage 3 with PH of cirrhosis, had low BP  Baseline Creatinine: 1.64 on 9/11/24  Creatinine at time of consult: 9.2 (raised from 2.27 in 7 days)  Imaging- US kidney: no evidence of hydronephrosis   MAP's in the 80's on midodrine.   On HRS protocol   Renal US stable.   Etiology of DONNA: HRS vs ATN    Azotemia 57 (BL 20-30)    Hypotension     Anemia - Hb 7.6    Recommendations :   worsening renal function become anuric, failed Lasix challenge   continue CRRT for metabolic clearance and volume removal  Renal panel as per CRRT protocol  Continue to monitor urine out put             Thank you for allowing us to participate in the care of this patient.  Plan discussed with attending. Please call with any questions or concerns.       Will Joyce MD.  Clinical Nephrology Fellow  [facility] [address]

## 2024-11-09 NOTE — PLAN OF CARE
Patient Education   Personalized Prevention Plan  You are due for the preventive services outlined below.  Your care team is available to assist you in scheduling these services.  If you have already completed any of these items, please share that information with your care team to update in your medical record.  Health Maintenance Due   Topic Date Due     Discuss Advance Care Planning  05/16/2018     PHQ-2  01/01/2019     FALL RISK ASSESSMENT  11/15/2019     Annual Wellness Visit  11/15/2019     Osteoporosis Screening  11/20/2019        Patient Education   Personalized Prevention Plan  You are due for the preventive services outlined below.  Your care team is available to assist you in scheduling these services.  If you have already completed any of these items, please share that information with your care team to update in your medical record.  Health Maintenance Due   Topic Date Due     Osteoporosis Screening  11/20/2019     Annual Wellness Visit  11/15/2019         Recommendations  Continuous TF recommendation: Isosource 1.5 @ 35 mL/hr to provide 840 mL total volume, 1260 kcal, 141 g CHO, 57 protein, 13 fiber, 653 mL free water, 84% DRI         --155 mL flush q4 hrs to provide additional 930 mL free water (Total 1583 mL)     Bolus TF recommendation: 3 cans/day of Isosource 1.5 to provide 750  mL total volume, 1125 kcal, 132 g CHO, 51 protein, 11 fiber, 573 mL free water, 75% DRI         --165 mL flush before and after each can to provide additional 990 mL free water (Total 1563 mL)     --Monitor for s/s of intolerance     --If PO intake desired, recommend Renal diet as tolerated and clinically indicated    Goals: --Meet % EEN/EPN  Nutrition Goal Status: new  Communication of RAYMUNDO Recs: other (comment) (POC)

## 2024-11-09 NOTE — PLAN OF CARE
worsening renal function become anuric, failed Lasix challenge    Acute hypoxic respiratory failure     Plan to start CRRT

## 2024-11-09 NOTE — EICU
Intervention Initiated From:  Bedside    Kuldeep intervened regarding:  Documentation and Time-Out      Comments: Called into room via eLert to do time-out for arterial line placement & for assistance w/ documentation (see time-out record).  LDA added in Epic.

## 2024-11-09 NOTE — PROCEDURES
"Rudolph Ojeda is a 59 y.o. female patient.    Temp: 97 °F (36.1 °C) (11/09/24 0320)  Pulse: 75 (11/09/24 0405)  Resp: (!) 36 (11/09/24 0405)  BP: (!) 85/46 (11/09/24 0405)  SpO2: (!) 89 % (11/09/24 0405)  Weight: 76.7 kg (169 lb 1.5 oz) (11/07/24 1144)  Height: 5' 3" (160 cm) (11/07/24 1144)       Arterial Line    Date/Time: 11/9/2024 4:39 AM  Location procedure was performed: [facility] CRITICAL CARE MEDICINE    Performed by: Letitia Berumen NP  Authorized by: Letitia Berumen NP  Pre-op Diagnosis: Shock  Post-operative diagnosis: Shock  Consent Done: Emergent Situation  Preparation: Patient was prepped and draped in the usual sterile fashion.  Indications: multiple ABGs, respiratory failure and hemodynamic monitoring  Location: right radial  Needle gauge: 20  Seldinger technique: Seldinger technique used  Number of attempts: 1  Complications: No  Estimated blood loss (mL): 0  Post-procedure: line sutured and dressing applied  Post-procedure CMS: unchanged  Patient tolerance: Patient tolerated the procedure well with no immediate complications        Letitia Berumen -[facility]  Pulmonary Critical Care Medicine   11/9/2024  4:39 AM      "

## 2024-11-09 NOTE — SUBJECTIVE & OBJECTIVE
Past Medical History:   Diagnosis Date    DONNA (acute kidney injury)     Alcoholic cirrhosis of liver     Thrombocytopenia        Past Surgical History:   Procedure Laterality Date    APPENDECTOMY         Review of patient's allergies indicates:  No Known Allergies    Family History    None       Tobacco Use    Smoking status: Every Day     Current packs/day: 0.25     Average packs/day: 0.3 packs/day for 40.0 years (10.0 ttl pk-yrs)     Types: Cigarettes    Smokeless tobacco: Not on file   Substance and Sexual Activity    Alcohol use: Not Currently     Comment: claims quit 9/2024    Drug use: Not on file    Sexual activity: Not on file      Review of Systems  Objective:     Vital Signs (Most Recent):  Temp: 98 °F (36.7 °C) (11/08/24 1548)  Pulse: (!) 114 (11/08/24 1734)  Resp: 20 (11/08/24 1734)  BP: (!) 158/93 (11/08/24 1734)  SpO2: 98 % (11/08/24 1734) Vital Signs (24h Range):  Temp:  [96 °F (35.6 °C)-98 °F (36.7 °C)] 98 °F (36.7 °C)  Pulse:  [] 114  Resp:  [16-20] 20  SpO2:  [88 %-99 %] 98 %  BP: (114-158)/(59-93) 158/93   Weight: 76.7 kg (169 lb 1.5 oz)  Body mass index is 29.95 kg/m².      Intake/Output Summary (Last 24 hours) at 11/8/2024 1819  Last data filed at 11/8/2024 1700  Gross per 24 hour   Intake 173.75 ml   Output 1 ml   Net 172.75 ml          Physical Exam  Constitutional:       General: She is in acute distress.      Appearance: She is ill-appearing and toxic-appearing.      Comments: Sedated and intubated   Eyes:      General: Scleral icterus present.      Pupils: Pupils are equal, round, and reactive to light.   Cardiovascular:      Rate and Rhythm: Regular rhythm. Tachycardia present.   Pulmonary:      Breath sounds: Rales (B/L throughout) present.   Abdominal:      General: There is distension.      Tenderness: There is abdominal tenderness. There is no guarding.   Musculoskeletal:         General: Swelling present.      Right lower leg: Edema present.      Left lower leg: Edema present.    Neurological:      Comments: Sedated, not answering questions  Prior to intubation, not following commands or answering questions. Open eyes to verbal stimulus            Vents:  Vent Mode: A/C (11/08/24 1734)  Ventilator Initiated: Yes (11/08/24 1735)  Set Rate: 20 BPM (11/08/24 1734)  Vt Set: 315 mL (11/08/24 1734)  PEEP/CPAP: 5 cmH20 (11/08/24 1734)  Oxygen Concentration (%): 100 (11/08/24 1734)  Peak Airway Pressure: 39 cmH20 (11/08/24 1734)  Plateau Pressure: 0 cmH20 (11/08/24 1734)  Total Ve: 6.48 L/m (11/08/24 1734)  Negative Inspiratory Force (cm H2O): 0 (11/08/24 1734)  F/VT Ratio<105 (RSBI): (!) 61.73 (11/08/24 1734)  Lines/Drains/Airways       Drain  Duration             Female External Urinary Catheter w/ Suction 11/07/24 1030 1 day         NG/OG Tube 11/08/24 1736 [place] [facility] mouth <1 day              Airway  Duration                  Airway - Non-Surgical 11/08/24 1727 Endotracheal Tube <1 day              Peripheral Intravenous Line  Duration                  Midline Catheter - Single Lumen 11/07/24 1825 Right basilic vein (medial side of arm) 18g x 10cm <1 day                  Significant Labs:    CBC/Anemia Profile:  Recent Labs   Lab 11/07/24  0330 11/07/24  1217 11/08/24  0849   WBC 11.37  --  11.68   HGB 7.6*  --  6.8*   HCT 23.5*  --  20.7*   PLT 41*  --  34*   *  --  102*   RDW 18.8*  --  19.1*   FOLATE  --  4.5  --    [id number]  --  >2000*  --         Chemistries:  Recent Labs   Lab 11/07/24  0330 11/07/24  1342 11/08/24  0849    137 139   K 3.6 3.4* 3.3*   CL 98 98 98   CO2 24 21* 18*   BUN 57* 58* 59*   CREATININE 9.2* 8.7* 9.6*   CALCIUM 8.8 9.1 9.7   ALBUMIN 3.2* 3.4* 3.6   PROT 4.8*  --  5.4*   BILITOT 6.3*  --  7.4*   ALKPHOS 51  --  50   ALT 22  --  27   AST 58*  --  88*   PHOS  --  4.9* 5.0*       All pertinent labs within the past 24 hours have been reviewed.    Significant Imaging: I have reviewed all pertinent imaging results/findings within the past 24  hours.

## 2024-11-09 NOTE — PROGRESS NOTES
"[facility] Progress Note      Attending: Lubna Gonzalez MD   Admit Date: 11/6/2024  Today's Date: 11/09/2024  Reason for Consult:  Management of decompensated cirrhosis    SUBJECTIVE:     Interval History:   While being transfused pRBCs on 11/8, she became short of breath and hypoxic. RR and MICU came to bedside and she was ultimately intubated given severe hypoxemia and inability to protect her airway. She was admitted to the ICU; overall concern was TACO vs TRALI. Due to worsening renal function, now with anuria, she was started on CRRT. She is currently on two pressor support, 100% FiO2 and 12 PEEP.     Scheduled Medications:    morphine  5 mg Intravenous Once       PRN Medications:     Current Facility-Administered Medications:     0.9%  NaCl infusion (for blood administration), , Intravenous, Q24H PRN    dextrose 10%, 12.5 g, Intravenous, PRN    dextrose 10%, 25 g, Intravenous, PRN    glucagon (human recombinant), 1 mg, Intramuscular, PRN    glucose, 16 g, Oral, PRN    glucose, 24 g, Oral, PRN    HYDROmorphone, 0.5 mg, Intravenous, Q15 Min PRN    lorazepam, 0.5 mg, Intravenous, Q30 Min PRN    lorazepam, 2 mg, Intravenous, Q15 Min PRN    naloxone, 0.02 mg, Intravenous, PRN    ondansetron, 8 mg, Intravenous, Q6H PRN    sodium chloride 0.9%, 10 mL, Intravenous, Q12H PRN    Flushing PICC/Midline Protocol, , , Until Discontinued **AND** sodium chloride 0.9%, 10 mL, Intravenous, Q12H PRN    Pharmacy to dose Vancomycin consult, , , Once **AND** vancomycin - pharmacy to dose, , Intravenous, pharmacy to manage frequency    OBJECTIVE:     Vital Signs Trends/Hx Reviewed  Vitals:    11/09/24 0826 11/09/24 0841 11/09/24 0900 11/09/24 1000   BP:       BP Location:       Patient Position:       Pulse:  84 82 81   Resp:  (!) 38 (!) 38 (!) 39   Temp:       TempSrc:       SpO2:  (!) 73% (!) 75% (!) 88%   Weight:       Height: 5' 3" (1.6 m) 5' 3" (1.6 m)         Physical Exam  Vitals reviewed.   Constitutional:      "  Appearance: She is ill-appearing.      Comments: Intubated, sedated.   Cardiovascular:      Rate and Rhythm: Normal rate.   Pulmonary:      Comments: Vent Mode: A/C  Oxygen Concentration (%):  [100] 100  Resp Rate Total:  [0 br/min-39 br/min] 39 br/min  Vt Set:  [315 mL-365 mL] 320 mL  PEEP/CPAP:  [5 liD70-35 cmH20] 12 cmH20  Mean Airway Pressure:  [0 tmZ32-86 cmH20] 13 cmH20             Laboratory:  Lab results in last 24 hours reviewed.     MELD 3.0: 40 at 11/9/2024  6:28 AM  MELD-Na: 40 at 11/9/2024  6:28 AM  Calculated from:  Serum Creatinine: 7.9 mg/dL (Using max of 3 mg/dL) at 11/9/2024  6:28 AM  Serum Sodium: 137 mmol/L at 11/9/2024  6:28 AM  Total Bilirubin: 10.4 mg/dL at 11/9/2024  5:05 AM  Serum Albumin: 3.4 g/dL at 11/9/2024  5:05 AM  INR(ratio): 2.8 at 11/9/2024  1:37 AM  Age at listing (hypothetical): 59 years  Sex: Female at 11/9/2024  6:28 AM      ASSESSMENT & RECOMMENDATIONS   59 year old female with decompensated alcoholic cirrhosis associated with ascites requiring prior paracentesis (most recent on 10/30 with 5L removed, negative for SBP) who presented to [facility] as a transfer from OSH for further workup of DONNA and encephalopathy. Hepatology consulted for further management of decompensated cirrhosis. She was encephalopathic on our first encounter; we recommended an infectious workup with empiric abx, CTH and lactulose. Nephrology consulted given concern for HRS; possibly HRS vs ATN. They recommended continuing midodrine, octreotide and albumin as she is not a candidate for terlipressin. Paracentesis attempted on 11/08 but minimal fluid obtained. On 11/08, she was became acutely hypoxemic while receiving a unit of pRBCs, requiring intubation and ultimately admission to the MICU. She was started on CRRT given anuria. Acute hypoxemia suspected to be from transfusion reaction. She remains intubated, sedated on two pressor support and CRRT. Serologic workup notable for positive JORDAN (titer 1:80), ASMA  (titer 1:40), Hep C Ab +, HCV RNA [id number]. A1AT and AMA negative. PETH, IgG pending.     Problem List:  Decompensated alcoholic cirrhosis MELD 40  Acute hypoxemic respiratory failure  Acute renal failure now on CRRT  Encephalopathy   Hep C infection (untreated)  Alcohol use disorder (quit two to three months ago)  Positive JORDAN and ASMA (low titers)    Recommendations:  -Unfortunately she is not a transplant candidate as she remains in the MICU intubated, critically ill with multiorgan failure requiring pressor support. Prior to her becoming intubated and needing pressor support, she was encephalopathic and unable to participate in meaningful discussions regarding her alcohol intake, diagnosis of cirrhosis and commitment towards sobriety. Myself and Dr. Gandhi discussed with two of her sisters at the bedside today that she is not a transplant candidate. We briefly discussed goals of care, and I re-iterated that this is something they should discuss as a family especially given how sick the patient is. They did mention that she would not want to live this way. This was communicated to the primary team.   -Encourage goals of care discussions with the primary team moving forward.       Thank you for allowing us to participate in the care of this patient. Please call with questions.      Yelitza Jonas MD  Gastroenterology Fellow, PGY-V  [facility]

## 2024-11-09 NOTE — PROGRESS NOTES
11/09/24 0542   Treatment   Treatment Type SLED   Treatment Status New start   Dialysis Machine Number [id number]   Dialyzer Time (hours) 0   BVP (Liters) 0 L   Solutions Labeled and Current  Yes   Access Temporary Cath;Right;IJ   Catheter Dressing Intact  Yes   Alarms Engaged Yes   CRRT Comments CRRT Nst.

## 2024-11-09 NOTE — DISCHARGE SUMMARY
[facility] - Medical ICU  Critical Care Medicine  Discharge Summary      Patient Name: Rudolph Ojdea  MRN: [id number]  Admission Date: 11/6/2024  Hospital Length of Stay: 3 days  Discharge Date and Time:  11/09/2024 12:34 PM  Attending Physician: Lubna Gonzalez MD   Discharging Provider: Luis Gonzalez MD  Primary Care Provider: No, Primary Doctor  Reason for Admission: Acute hypoxemic respiratory failure    HPI:   Rudolph Ojeda is a 59 y.o. F with hx of alcoholic cirrhosis status post recent paracentesis 10/30/2024 (5 L removed) who was being seen at [facility] as transfer from [facility] where she presented with bilateral lower extremity edema with lower extremity ultrasound negative for DVT. Patient transferred secondary to acute kidney injury and bilateral pleural effusions. Prior to transfer, patient denying fevers, dyspnea, nausea, vomiting or increasing abdominal girth status post paracentesis. Patient admitted to [facility] with probable hepatorenal syndrome w/ gradually worsening ascites s/p para a few days prior. Nephrology and Gastroenterology were consulted. Pt claims her last alcohol intake was about 2-1/2 months ago. She was started on hepatorenal protocol. She was also started on Lasix and Aldactone. Bilateral renal ultrasound studies showed no evidence of hydronephrosis. During stay at OSH, hemoglobin dropped and renal function continued to worsen despite treatment. After discussion with Gastroenterology and Nephrology, decision made to transfer patient to a tertiary care center for hepatology evaluation. On arrival to [facility] on 11/6, patient alert and oriented and HDS. Pt admitted to hospital medicine w/ plans for Hepatology and Nephrology consultation for further workup and management of hepatorenal syndrome and liver failure.     While admitted to , she was started on SBP prophylaxis with ceftriaxone. She became progressively more altered over the course of the past 2 days. Ammonia was  elevated at 129, and her AMS was attributed to hepatic encephalopathy. Her mentation continued to worsen, despite lactulose. On 11/8, Hgb dropped < 7 and received 1 unit pRBC. While being transfused, nurse noticed a change in breath sounds and she desaturated to 70's and was placed on 3L NC. She then desaturated to 50's and was supported with ambu bag while transported to ICU, then immediately intubated upon arrival to MICU. Admitted to MICU for acute hypoxic respiratory failure.    * No surgery found *    Indwelling Lines/Drains at Time of Discharge:   Lines/Drains/Airways       Central Venous Catheter Line  Duration             Trialysis (Dialysis) Catheter 11/09/24 0249 right internal jugular <1 day              Drain  Duration                  NG/OG Tube 11/08/24 1736 [place] mouth <1 day         Urethral Catheter 11/08/24 1700 <1 day              Arterial Line  Duration             Arterial Line 11/09/24 0437 Right Radial <1 day                  Hospital Course:   While admitted to , she was started on SBP prophylaxis with ceftriaxone. She became progressively more altered over the course of the past 2 days. Ammonia was elevated at 129, and her AMS was attributed to hepatic encephalopathy. Her mentation continued to worsen, despite lactulose. On 11/8, Hgb dropped < 7 and received 1 unit pRBC. While being transfused, nurse noticed a change in breath sounds and she desaturated to 70's and was placed on 3L NC. Transfusion was stopped. She then desaturated to 50's and was supported with ambu bag while transported to ICU, then immediately intubated upon arrival to MICU. Admitted to MICU for acute hypoxic respiratory failure. Concern for TACO or TRALI vs ARDS. She underwent CRRT and required maximum ventilatory support with an FiO2 of 100% and PEEP of 12 to maintain any oxygenation above 70%.      Critical care team discussed the severity of the patient's condition with multiple family members present on  "the morning of 24 who shared that they had been discussing the goals of care for her mother extensively the night previously. In light of the patient's critical illness requiring multiple forms of life support, the family was in agreement that her current level of care and continuation of aggressive interventions would not be in line with her wishes and opted to pursue comfort care only and palliative extubation was performed with family at bedside. Shortly after, the patient  and was pronounced  at 11:45AM, 2024.         Consults (From admission, onward)          Status Ordering Provider     Inpatient consult to Registered Dietitian/Nutritionist  Once        Provider:  (Not yet assigned)    Completed ROGELIO ZHENG     Pharmacy to dose Vancomycin consult  Once        Provider:  (Not yet assigned)   Placed in "And" Linked Group    Acknowledged TORIN SANCHES     Inpatient consult to Midline team  Once        Provider:  (Not yet assigned)    Completed KM MERCEDES     Inpatient consult to Palliative Care  Once        Provider:  (Not yet assigned)    Completed CARLA PALMER     Inpatient consult to Registered Dietitian/Nutritionist  Once        Provider:  (Not yet assigned)    Completed CARLA PALMER     Inpatient consult to Nephrology  Once        Provider:  (Not yet assigned)    Completed CARLA PALMER     Inpatient consult to Hepatology  Once        Provider:  (Not yet assigned)    Completed CARLA PALMER          Significant Labs:  All pertinent labs within the past 24 hours have been reviewed.    Significant Imaging:  I have reviewed all pertinent imaging results/findings within the past 24 hours.    Pending Diagnostic Studies:       Procedure Component Value Units Date/Time    APOL1 Genotyping [[id number]]     Order Status: Sent Lab Status: No result     Specimen: Blood     Anti-neutrophilic cytoplasmic antibody [[id number]] Collected: 24 0137    Order Status: Sent " Lab Status: In process Updated: 11/09/24 0246    Specimen: Blood     Basic metabolic panel [[id number]]     Order Status: Sent Lab Status: No result     Specimen: Blood     CT Chest Abdomen Pelvis Without Contrast (XPD) [[id number]]     Order Status: Sent Lab Status: No result     CT Head Without Contrast [[id number]]     Order Status: Sent Lab Status: No result     Cryoglobulin [[id number]] Collected: 11/09/24 0135    Order Status: Sent Lab Status: In process Updated: 11/09/24 0139    Specimen: Blood     EKG 12-lead [[id number]]     Order Status: Sent Lab Status: No result     Akash-Barr virus DNA, quantitative [[id number]] Collected: 11/09/24 0137    Order Status: Sent Lab Status: In process Updated: 11/09/24 0246    Specimen: Blood     Glomerular basement membrane antibodies [[id number]] Collected: 11/09/24 0137    Order Status: Sent Lab Status: In process Updated: 11/09/24 0246    Specimen: Blood     IgG 1, 2, 3, and 4 [[id number]] Collected: 11/07/24 1217    Order Status: Sent Lab Status: In process Updated: 11/07/24 1227    Specimen: Blood     Narrative:      Collection has been rescheduled by AMRITA at 11/07/2024 09:05 Reason:   Unable to collect patient did not want to keep still the nurse was in   the room    Immunoglobulin free LT chains blood [[id number]] Collected: 11/09/24 0137    Order Status: Sent Lab Status: In process Updated: 11/09/24 0246    Specimen: Blood     Immunoglobulin free LT chains blood [[id number]] Collected: 11/09/24 0137    Order Status: Sent Lab Status: In process Updated: 11/09/24 0246    Specimen: Blood     Myeloperoxidase Antibody (MPO) [[id number]] Collected: 11/09/24 0137    Order Status: Sent Lab Status: In process Updated: 11/09/24 0246    Specimen: Blood     Proteinase 3 Autoantibodies [[id number]] Collected: 11/09/24 0137    Order Status: Sent Lab Status: In process Updated: 11/09/24 0246    Specimen: Blood           Final Active Diagnoses:    Diagnosis Date Noted POA     PRINCIPAL PROBLEM:  Acute hypoxemic respiratory failure [J96.01] 2024 No    Comfort measures only status [Z51.5] 2024 Not Applicable    Anuria [R34] 2024 Yes    Abdominal pain [R10.9] 2024 Yes    Acute hypoxic respiratory failure [J96.01] 2024 No    Coagulopathy [D68.9] 2024 Yes    Acute encephalopathy [G93.40] 2024 Yes    Blood transfusion reaction [T80.92XA] 2024 No    Decompensated hepatic cirrhosis [K72.90, K74.60] 2024 Yes    Airway compromise [J98.8] 2024 No    Malnutrition [E46] 2024 Yes    Anemia in other chronic diseases classified elsewhere [D63.8] 2024 Yes    Palliative care encounter [Z51.5] 2024 Not Applicable    Hepatic encephalopathy [K76.82] 2024 Yes    Hepatitis C [B19.20] 2024 Yes    DONNA (acute kidney injury) [N17.9] 2024 Yes    Hepatorenal syndrome [K76.7] 2024 Yes    Ascites due to alcoholic cirrhosis [K70.31] 2024 Yes    ACP (advance care planning) [Z71.89] 2024 Not Applicable      Problems Resolved During this Admission:    Diagnosis Date Noted Date Resolved POA    Acute urinary retention [R33.8] 2024 Yes     No new Assessment & Plan notes have been filed under this hospital service since the last note was generated.  Service: Critical Care Medicine    Discharged Condition:     Disposition:     Patient Instructions:   No discharge procedures on file.  Medications:  Reconciled Home Medications:      Medication List      You have not been prescribed any medications.          Luis Gonzalez MD  Critical Care Medicine  [facility] - Medical ICU

## 2024-11-09 NOTE — PROCEDURES
Central Line    Date/Time: 11/9/2024 3:20 AM    Performed by: Svitlana Britton MD  Authorized by: Lubna Gonzalez MD    Location procedure was performed:  [facility]  Consent Done ?:  Yes  Time out complete?: Verified correct patient, procedure, equipment, staff, and site/side    Indications:  Hemodialysis  Anesthesia:  Local infiltration  Local anesthetic:  Lidocaine 1% without epinephrine  Anesthetic total (ml):  2  Preparation:  Skin prepped with ChloraPrep  Skin prep agent dried: Skin prep agent completely dried prior to procedure    Sterile barriers: All five maximal sterile barriers used - gloves, gown, cap, mask and large sterile sheet    Hand hygiene: Hand hygiene performed immediately prior to central venous catheter insertion    Location:  Right internal jugular  Catheter type:  Trialysis  Catheter size:  13 Fr  Inserted Catheter Length (cm):  15  Ultrasound guidance: Yes    Vessel Caliber:  Large   patent  Comprressibility:  Normal  Needle advanced into vessel with real time ultrasound guidance.    Guidewire confirmed in vessel.    Steril sheath on probe.    Sterile gel used.  Manometry: Yes    Number of attempts:  1  Securement:  Line sutured, chlorhexidine patch, sterile dressing applied and blood return through all ports  Complications: No    Guidewire: guidewire removed intact, verified with nurse    XRay:  Placement verified by x-ray  Adverse Events:  NoneTermination Site: superior vena cava

## 2024-11-09 NOTE — PROCEDURES
"Rudolph Ojeda is a 59 y.o. female patient.    Temp: (!) 94 °F (34.4 °C) (11/08/24 1915)  Pulse: 98 (11/08/24 2032)  Resp: (!) 29 (11/08/24 2032)  BP: 131/76 (11/08/24 2032)  SpO2: 95 % (11/08/24 2032)  Weight: 76.7 kg (169 lb 1.5 oz) (11/07/24 1144)  Height: 5' 3" (160 cm) (11/07/24 1144)       Intubation    Date/Time: 11/8/2024 10:11 PM  Location procedure was performed: [facility] PULMONARY MEDICINE    Performed by: Vasu Choi MD  Authorized by: Vasu Choi MD  Assisting provider: Dariusz Diehl MD  Pre-operative diagnosis: acute hypoxemic respiratory failure & airway compromise  Post-operative diagnosis: acute hypoxemic respiratory failure & airway compromise  Consent Done: Emergent Situation  Indications: respiratory failure and airway protection  Description of findings: grade 1 view. gastric contents in oropharynx.   Intubation method: video-assisted  Patient status: paralyzed (RSI)  Preoxygenation: nonrebreather mask  Sedatives: etomidate  Paralytic: rocuronium  Laryngoscope size: Glide 3  Tube size: 8.0 mm  Tube type: cuffed  Number of attempts: 2 (dr diehl attempted first intubation, dr choi second)  Ventilation between attempts: BVM  Cricoid pressure: no  Cords visualized: yes  Post-procedure assessment: chest rise  Breath sounds: equal and absent over the epigastrium  Cuff inflated: yes  ETT to lip: 24 cm  Tube secured with: ETT aldrich  Chest x-ray findings: endotracheal tube in appropriate position  Estimated blood loss (mL): 0          11/8/2024    "

## 2024-11-11 LAB
ALBUMIN SERPL ELPH-MCNC: 3.47 G/DL (ref 3.35–5.55)
ALPHA1 GLOB SERPL ELPH-MCNC: 0.15 G/DL (ref 0.17–0.41)
ALPHA2 GLOB SERPL ELPH-MCNC: 0.17 G/DL (ref 0.43–0.99)
ANCA AB TITR SER IF: NORMAL TITER
B-GLOBULIN SERPL ELPH-MCNC: 0.26 G/DL (ref 0.5–1.1)
BM IGG SER-ACNC: <0.2 U
EPSTEIN-BARR VIRUS DNA: NORMAL
EPSTEIN-BARR VIRUS PCR, QUANT: NOT DETECTED IU/ML
GAMMA GLOB SERPL ELPH-MCNC: 0.85 G/DL (ref 0.67–1.58)
IGG1 SER-MCNC: 500 MG/DL (ref 382–929)
IGG2 SER-MCNC: 209 MG/DL (ref 242–700)
IGG3 SER-MCNC: 57 MG/DL (ref 22–176)
IGG4 SER-MCNC: 25 MG/DL (ref 4–86)
KAPPA LC SER QL IA: 14.97 MG/DL (ref 0.33–1.94)
KAPPA LC SER QL IA: 14.97 MG/DL (ref 0.33–1.94)
KAPPA LC/LAMBDA SER IA: 0.02 (ref 0.26–1.65)
KAPPA LC/LAMBDA SER IA: 0.02 (ref 0.26–1.65)
LAMBDA LC SER QL IA: 739.9 MG/DL (ref 0.57–2.63)
LAMBDA LC SER QL IA: 739.9 MG/DL (ref 0.57–2.63)
MYELOPEROXIDASE AB SER-ACNC: <0.2 U/ML
P-ANCA TITR SER IF: NORMAL TITER
PROT SERPL-MCNC: 4.9 G/DL (ref 6–8.4)
PROTEINASE3 IGG SER-ACNC: <0.2 U

## 2024-11-13 LAB
BACTERIA BLD CULT: NORMAL
BACTERIA BLD CULT: NORMAL
PATHOLOGIST INTERPRETATION SPE: NORMAL
PATHOLOGIST INTERPRETATION TRANSF REACTION: NORMAL

## 2024-11-14 LAB
B19V DNA # SPEC NAA+PROBE: NOT DETECTED COPIES/ML
PARVOVIRUS B19 DNA, QUANT: NOT DETECTED LOG CPS/ML
SPECIMEN SOURCE: NORMAL
SYMPTOMS P TRANSF RX PATIENT-IMP: NORMAL